# Patient Record
Sex: FEMALE | Race: WHITE | Employment: PART TIME | URBAN - METROPOLITAN AREA
[De-identification: names, ages, dates, MRNs, and addresses within clinical notes are randomized per-mention and may not be internally consistent; named-entity substitution may affect disease eponyms.]

---

## 2017-02-03 ENCOUNTER — TELEPHONE (OUTPATIENT)
Dept: GYNECOLOGY | Age: 42
End: 2017-02-03

## 2017-02-06 RX ORDER — MEDROXYPROGESTERONE ACETATE 10 MG/1
TABLET ORAL
Qty: 60 TABLET | Refills: 0 | Status: SHIPPED | OUTPATIENT
Start: 2017-02-06 | End: 2017-03-05 | Stop reason: SDUPTHER

## 2017-03-06 RX ORDER — MEDROXYPROGESTERONE ACETATE 10 MG/1
TABLET ORAL
Qty: 60 TABLET | Refills: 0 | Status: SHIPPED | OUTPATIENT
Start: 2017-03-06 | End: 2018-01-19 | Stop reason: ALTCHOICE

## 2017-03-21 ENCOUNTER — TELEPHONE (OUTPATIENT)
Dept: PRIMARY CARE CLINIC | Age: 42
End: 2017-03-21

## 2017-03-23 ENCOUNTER — OFFICE VISIT (OUTPATIENT)
Dept: GYNECOLOGY | Age: 42
End: 2017-03-23

## 2017-03-23 VITALS
HEIGHT: 64 IN | TEMPERATURE: 97.3 F | BODY MASS INDEX: 24.04 KG/M2 | RESPIRATION RATE: 17 BRPM | SYSTOLIC BLOOD PRESSURE: 115 MMHG | HEART RATE: 74 BPM | DIASTOLIC BLOOD PRESSURE: 75 MMHG | WEIGHT: 140.8 LBS

## 2017-03-23 DIAGNOSIS — Z09 FOLLOW-UP EXAM, 3-6 MONTHS SINCE PREVIOUS EXAM: ICD-10-CM

## 2017-03-23 DIAGNOSIS — R87.810 ASCUS WITH POSITIVE HIGH RISK HPV CERVICAL: Primary | ICD-10-CM

## 2017-03-23 DIAGNOSIS — R82.90 ABNORMAL URINE ODOR: ICD-10-CM

## 2017-03-23 DIAGNOSIS — R87.610 ASCUS WITH POSITIVE HIGH RISK HPV CERVICAL: Primary | ICD-10-CM

## 2017-03-23 DIAGNOSIS — N89.8 VAGINAL ODOR: ICD-10-CM

## 2017-03-23 LAB
BACTERIA WET PREP: NORMAL
CLUE CELLS: NORMAL
EPITHELIAL CELLS WET PREP: NORMAL
RBC WET PREP: NORMAL
SOURCE WET PREP: NORMAL
TRICHOMONAS PREP: NORMAL
WBC WET PREP: NORMAL
YEAST WET PREP: NORMAL

## 2017-03-23 PROCEDURE — 99213 OFFICE O/P EST LOW 20 MIN: CPT | Performed by: OBSTETRICS & GYNECOLOGY

## 2017-03-23 RX ORDER — THYROID 60 MG
60 TABLET ORAL DAILY
Status: ON HOLD | COMMUNITY
Start: 2017-03-13 | End: 2022-09-02

## 2017-03-25 LAB — URINE CULTURE, ROUTINE: NORMAL

## 2017-03-26 LAB
GENITAL CULTURE, ROUTINE: ABNORMAL
GENITAL CULTURE, ROUTINE: ABNORMAL
ORGANISM: ABNORMAL

## 2017-03-27 LAB
HPV COMMENT: ABNORMAL
HPV TYPE 16: DETECTED
HPV TYPE 18: NOT DETECTED
HPVOH (OTHER TYPES): NOT DETECTED

## 2017-03-28 DIAGNOSIS — N76.0 BV (BACTERIAL VAGINOSIS): Primary | ICD-10-CM

## 2017-03-28 DIAGNOSIS — B96.89 BV (BACTERIAL VAGINOSIS): Primary | ICD-10-CM

## 2017-03-28 RX ORDER — METRONIDAZOLE 500 MG/1
500 TABLET ORAL 2 TIMES DAILY
Qty: 14 TABLET | Refills: 0 | OUTPATIENT
Start: 2017-03-28 | End: 2017-04-04

## 2017-04-03 ENCOUNTER — TELEPHONE (OUTPATIENT)
Dept: GYNECOLOGY | Age: 42
End: 2017-04-03

## 2017-04-06 ENCOUNTER — OFFICE VISIT (OUTPATIENT)
Dept: PRIMARY CARE CLINIC | Age: 42
End: 2017-04-06

## 2017-04-06 VITALS
WEIGHT: 138 LBS | OXYGEN SATURATION: 99 % | BODY MASS INDEX: 23.69 KG/M2 | SYSTOLIC BLOOD PRESSURE: 107 MMHG | DIASTOLIC BLOOD PRESSURE: 70 MMHG | HEART RATE: 74 BPM

## 2017-04-06 DIAGNOSIS — T14.90XA INJURY: Primary | ICD-10-CM

## 2017-04-06 PROCEDURE — 99213 OFFICE O/P EST LOW 20 MIN: CPT | Performed by: INTERNAL MEDICINE

## 2017-04-06 ASSESSMENT — ENCOUNTER SYMPTOMS
GASTROINTESTINAL NEGATIVE: 1
EYES NEGATIVE: 1
RESPIRATORY NEGATIVE: 1

## 2017-04-17 ENCOUNTER — HOSPITAL ENCOUNTER (OUTPATIENT)
Dept: OTHER | Age: 42
Discharge: HOME | End: 2017-04-17
Attending: INTERNAL MEDICINE | Admitting: INTERNAL MEDICINE

## 2017-04-17 DIAGNOSIS — T14.90XA INJURY: ICD-10-CM

## 2017-04-17 LAB
T4 FREE: 1.1 NG/DL (ref 0.9–1.8)
TSH SERPL DL<=0.05 MIU/L-ACNC: 0.21 UIU/ML (ref 0.27–4.2)

## 2017-04-25 ENCOUNTER — TELEPHONE (OUTPATIENT)
Dept: GYNECOLOGY | Age: 42
End: 2017-04-25

## 2017-04-26 ENCOUNTER — OFFICE VISIT (OUTPATIENT)
Dept: GYNECOLOGY | Age: 42
End: 2017-04-26

## 2017-04-26 VITALS
SYSTOLIC BLOOD PRESSURE: 111 MMHG | HEIGHT: 64 IN | HEART RATE: 64 BPM | BODY MASS INDEX: 23.75 KG/M2 | RESPIRATION RATE: 17 BRPM | WEIGHT: 139.13 LBS | DIASTOLIC BLOOD PRESSURE: 76 MMHG

## 2017-04-26 DIAGNOSIS — R87.610 ASCUS WITH POSITIVE HIGH RISK HPV CERVICAL: ICD-10-CM

## 2017-04-26 DIAGNOSIS — Z32.00 ENCOUNTER FOR PREGNANCY TEST, RESULT UNKNOWN: ICD-10-CM

## 2017-04-26 DIAGNOSIS — L73.9 INFLAMMATION OF HAIR FOLLICLES: ICD-10-CM

## 2017-04-26 DIAGNOSIS — R87.810 ASCUS WITH POSITIVE HIGH RISK HPV CERVICAL: ICD-10-CM

## 2017-04-26 DIAGNOSIS — R87.611 PAP SMEAR OF CERVIX WITH ASCUS, CANNOT EXCLUDE HGSIL: Primary | ICD-10-CM

## 2017-04-26 LAB
CONTROL: NORMAL
PREGNANCY TEST URINE, POC: NORMAL

## 2017-04-26 PROCEDURE — 57454 BX/CURETT OF CERVIX W/SCOPE: CPT | Performed by: OBSTETRICS & GYNECOLOGY

## 2017-04-26 PROCEDURE — 81025 URINE PREGNANCY TEST: CPT | Performed by: OBSTETRICS & GYNECOLOGY

## 2017-04-28 ENCOUNTER — TELEPHONE (OUTPATIENT)
Dept: GYNECOLOGY | Age: 42
End: 2017-04-28

## 2017-05-03 DIAGNOSIS — N76.0 VAGINAL INFECTION: Primary | ICD-10-CM

## 2017-05-03 RX ORDER — CLINDAMYCIN PHOSPHATE 20 MG/G
CREAM VAGINAL
Qty: 1 TUBE | Refills: 0 | OUTPATIENT
Start: 2017-05-03 | End: 2017-05-10

## 2017-08-31 ENCOUNTER — OFFICE VISIT (OUTPATIENT)
Dept: GYNECOLOGY | Age: 42
End: 2017-08-31

## 2017-08-31 VITALS
BODY MASS INDEX: 23.11 KG/M2 | RESPIRATION RATE: 17 BRPM | DIASTOLIC BLOOD PRESSURE: 77 MMHG | SYSTOLIC BLOOD PRESSURE: 108 MMHG | WEIGHT: 135.38 LBS | TEMPERATURE: 98 F | HEART RATE: 78 BPM | HEIGHT: 64 IN

## 2017-08-31 DIAGNOSIS — Z98.890 H/O COLPOSCOPY WITH CERVICAL BIOPSY: ICD-10-CM

## 2017-08-31 DIAGNOSIS — N39.0 URINARY TRACT INFECTION WITHOUT HEMATURIA, SITE UNSPECIFIED: ICD-10-CM

## 2017-08-31 DIAGNOSIS — R87.810 ASCUS WITH POSITIVE HIGH RISK HPV CERVICAL: Primary | ICD-10-CM

## 2017-08-31 DIAGNOSIS — R10.2 PELVIC PAIN IN FEMALE: ICD-10-CM

## 2017-08-31 DIAGNOSIS — R87.610 ASCUS WITH POSITIVE HIGH RISK HPV CERVICAL: Primary | ICD-10-CM

## 2017-08-31 PROCEDURE — 99213 OFFICE O/P EST LOW 20 MIN: CPT | Performed by: OBSTETRICS & GYNECOLOGY

## 2017-09-02 LAB
ORGANISM: ABNORMAL
URINE CULTURE, ROUTINE: ABNORMAL

## 2017-09-05 ENCOUNTER — TELEPHONE (OUTPATIENT)
Dept: GYNECOLOGY | Age: 42
End: 2017-09-05

## 2017-09-05 DIAGNOSIS — M54.50 ACUTE MIDLINE LOW BACK PAIN WITHOUT SCIATICA: Primary | ICD-10-CM

## 2017-09-05 DIAGNOSIS — R30.0 DYSURIA: ICD-10-CM

## 2017-09-05 RX ORDER — TRAMADOL HYDROCHLORIDE 50 MG/1
50 TABLET ORAL EVERY 6 HOURS PRN
Qty: 20 TABLET | Refills: 0 | Status: SHIPPED | OUTPATIENT
Start: 2017-09-05 | End: 2017-09-15

## 2017-09-05 RX ORDER — NITROFURANTOIN 25; 75 MG/1; MG/1
100 CAPSULE ORAL 2 TIMES DAILY
Qty: 14 CAPSULE | Refills: 0 | Status: SHIPPED | OUTPATIENT
Start: 2017-09-05 | End: 2017-09-12

## 2017-09-07 ENCOUNTER — TELEPHONE (OUTPATIENT)
Dept: GYNECOLOGY | Age: 42
End: 2017-09-07

## 2017-09-07 DIAGNOSIS — R30.0 DYSURIA: Primary | ICD-10-CM

## 2017-09-07 RX ORDER — CEPHALEXIN 500 MG/1
500 CAPSULE ORAL 2 TIMES DAILY
Qty: 14 CAPSULE | Refills: 0 | Status: SHIPPED | OUTPATIENT
Start: 2017-09-07 | End: 2017-09-14

## 2017-09-27 ENCOUNTER — HOSPITAL ENCOUNTER (OUTPATIENT)
Dept: ULTRASOUND IMAGING | Age: 42
Discharge: HOME | End: 2017-09-27
Attending: OBSTETRICS & GYNECOLOGY | Admitting: OBSTETRICS & GYNECOLOGY

## 2017-09-27 DIAGNOSIS — R10.2 PELVIC AND PERINEAL PAIN: ICD-10-CM

## 2017-09-27 DIAGNOSIS — R10.2 PELVIC PAIN IN FEMALE: ICD-10-CM

## 2017-10-13 ENCOUNTER — TELEPHONE (OUTPATIENT)
Dept: GYNECOLOGY | Age: 42
End: 2017-10-13

## 2017-10-13 NOTE — TELEPHONE ENCOUNTER
Patient is currently scheduled for a hysterectomy on 11/8/17 and needs to cancel this. Patient states that this is a FMLA matter with work. She would like to proceed with procedure, but will need it scheduled after January 2018. Routing to MD and staff as Mariela Mcdonald.

## 2017-11-20 ENCOUNTER — OFFICE VISIT (OUTPATIENT)
Dept: PRIMARY CARE CLINIC | Age: 42
End: 2017-11-20

## 2017-11-20 VITALS
BODY MASS INDEX: 23.53 KG/M2 | WEIGHT: 141.2 LBS | HEIGHT: 65 IN | DIASTOLIC BLOOD PRESSURE: 72 MMHG | HEART RATE: 58 BPM | TEMPERATURE: 98.4 F | SYSTOLIC BLOOD PRESSURE: 112 MMHG | OXYGEN SATURATION: 97 %

## 2017-11-20 DIAGNOSIS — R31.9 URINARY TRACT INFECTION WITH HEMATURIA, SITE UNSPECIFIED: Primary | ICD-10-CM

## 2017-11-20 DIAGNOSIS — N39.0 URINARY TRACT INFECTION WITH HEMATURIA, SITE UNSPECIFIED: Primary | ICD-10-CM

## 2017-11-20 DIAGNOSIS — R30.0 DYSURIA: ICD-10-CM

## 2017-11-20 DIAGNOSIS — M54.5 ACUTE MIDLINE LOW BACK PAIN, WITH SCIATICA PRESENCE UNSPECIFIED: ICD-10-CM

## 2017-11-20 DIAGNOSIS — N92.6 IRREGULAR PERIODS: ICD-10-CM

## 2017-11-20 DIAGNOSIS — R10.9 ABDOMINAL PAIN, UNSPECIFIED ABDOMINAL LOCATION: ICD-10-CM

## 2017-11-20 LAB
BILIRUBIN, POC: NORMAL
BLOOD URINE, POC: NORMAL
CLARITY, POC: NORMAL
COLOR, POC: NORMAL
GLUCOSE URINE, POC: NORMAL
KETONES, POC: NORMAL
LEUKOCYTE EST, POC: NORMAL
NITRITE, POC: NORMAL
PH, POC: 7
PROTEIN, POC: NORMAL
SPECIFIC GRAVITY, POC: 1.02
UROBILINOGEN, POC: 0.2

## 2017-11-20 PROCEDURE — 81025 URINE PREGNANCY TEST: CPT | Performed by: FAMILY MEDICINE

## 2017-11-20 PROCEDURE — 99214 OFFICE O/P EST MOD 30 MIN: CPT | Performed by: FAMILY MEDICINE

## 2017-11-20 PROCEDURE — G8484 FLU IMMUNIZE NO ADMIN: HCPCS | Performed by: FAMILY MEDICINE

## 2017-11-20 PROCEDURE — 81002 URINALYSIS NONAUTO W/O SCOPE: CPT | Performed by: FAMILY MEDICINE

## 2017-11-20 PROCEDURE — G8420 CALC BMI NORM PARAMETERS: HCPCS | Performed by: FAMILY MEDICINE

## 2017-11-20 PROCEDURE — 1036F TOBACCO NON-USER: CPT | Performed by: FAMILY MEDICINE

## 2017-11-20 PROCEDURE — G8427 DOCREV CUR MEDS BY ELIG CLIN: HCPCS | Performed by: FAMILY MEDICINE

## 2017-11-20 RX ORDER — PHENAZOPYRIDINE HYDROCHLORIDE 100 MG/1
100 TABLET, FILM COATED ORAL 3 TIMES DAILY PRN
Qty: 20 TABLET | Refills: 0 | Status: SHIPPED | OUTPATIENT
Start: 2017-11-20 | End: 2018-01-15 | Stop reason: ALTCHOICE

## 2017-11-20 ASSESSMENT — ENCOUNTER SYMPTOMS
RECTAL PAIN: 0
EYE PAIN: 0
SINUS PRESSURE: 0
CONSTIPATION: 0
EYE ITCHING: 0
SORE THROAT: 0
VOMITING: 0
TROUBLE SWALLOWING: 0
RHINORRHEA: 0
COLOR CHANGE: 0
COUGH: 0
CHOKING: 0
BLOOD IN STOOL: 0
NAUSEA: 0
DIARRHEA: 0
CHEST TIGHTNESS: 0
EYE DISCHARGE: 0
SHORTNESS OF BREATH: 0
EYE REDNESS: 0
APNEA: 0
BACK PAIN: 0
PHOTOPHOBIA: 0
ABDOMINAL DISTENTION: 0
WHEEZING: 0
STRIDOR: 0
ABDOMINAL PAIN: 1

## 2017-11-20 ASSESSMENT — PATIENT HEALTH QUESTIONNAIRE - PHQ9
SUM OF ALL RESPONSES TO PHQ9 QUESTIONS 1 & 2: 0
1. LITTLE INTEREST OR PLEASURE IN DOING THINGS: 0
2. FEELING DOWN, DEPRESSED OR HOPELESS: 0
SUM OF ALL RESPONSES TO PHQ QUESTIONS 1-9: 0

## 2017-11-20 NOTE — PROGRESS NOTES
Subjective:      Patient ID: Paulo Kennedy is a 36 y.o. female. The chief complaint(s)  Worried about recurrent uti  HPI  Seen in er 11/16/17, [facility] for  Left flank pain at that time . Some burning of urine. Told she had back strain(lifted heavy ice bucket a day before the visit) and uti. She was treated with oxycodone, robaxin, ibuprofen, po prednisone taper  Not much better now  She has had previous uti in past. Now some dysuria, left flank, left abdom pain. No diarrhea. Currently on ceftin. Some chills  But no fever/ on antibiotic ceftin 250 mg bid since 11/16/17, days but back pain worse, radiating  Around to the left abdomen area/f;aml area. Some nausea. She Is on ibuprofen, oxycodone given(not  Taking this). Some nausea but not vomiting at this time. U/a at [facility] 0-3 rbc,  11-20 wbc. History of kidney stones    Several years kidney stones  Never treated  Has had \"bladder stretched in past\"      Review of Systems   Constitutional: Negative for activity change, appetite change, chills, diaphoresis, fatigue and fever. HENT: Negative for congestion, dental problem, drooling, ear discharge, ear pain, hearing loss, mouth sores, nosebleeds, postnasal drip, rhinorrhea, sinus pressure, sneezing, sore throat, tinnitus and trouble swallowing. Eyes: Negative for photophobia, pain, discharge, redness, itching and visual disturbance. Respiratory: Negative for apnea, cough, choking, chest tightness, shortness of breath, wheezing and stridor. Cardiovascular: Negative for chest pain, palpitations and leg swelling. Gastrointestinal: Positive for abdominal pain. Negative for abdominal distention, blood in stool, constipation, diarrhea, nausea, rectal pain and vomiting. Genitourinary: Positive for dysuria. Negative for decreased urine volume, difficulty urinating, dyspareunia, enuresis, flank pain, frequency, genital sores, hematuria, menstrual problem, pelvic pain, urgency, vaginal bleeding and vaginal pain. Musculoskeletal: Negative for arthralgias, back pain, gait problem, joint swelling, myalgias, neck pain and neck stiffness. Skin: Negative for color change, pallor, rash and wound. Neurological: Negative for dizziness, tremors, seizures, syncope, facial asymmetry, speech difficulty, weakness, light-headedness, numbness and headaches. Hematological: Negative for adenopathy. Does not bruise/bleed easily. Psychiatric/Behavioral: Negative for agitation, behavioral problems, confusion, decreased concentration, dysphoric mood, hallucinations, self-injury, sleep disturbance and suicidal ideas. The patient is not nervous/anxious and is not hyperactive. Objective:   Physical Exam   Constitutional: She is oriented to person, place, and time. She appears well-developed and well-nourished. No distress. HENT:   Head: Normocephalic and atraumatic. Right Ear: External ear normal.   Left Ear: External ear normal.   Nose: Nose normal.   Mouth/Throat: Oropharynx is clear and moist. No oropharyngeal exudate. Eyes: Conjunctivae and EOM are normal. Pupils are equal, round, and reactive to light. Left eye exhibits no discharge. No scleral icterus. Neck: Normal range of motion. Neck supple. No JVD present. No tracheal deviation present. No thyromegaly present. Cardiovascular: Normal rate, regular rhythm, normal heart sounds and intact distal pulses. Exam reveals no gallop and no friction rub. No murmur heard. Pulmonary/Chest: Effort normal and breath sounds normal. No stridor. No respiratory distress. She has no wheezes. She has no rales. She exhibits no tenderness. Abdominal: Soft. Bowel sounds are normal. She exhibits no distension and no mass. There is no tenderness. There is no rebound and no guarding. Mild left abdominal and flank pain/tenderness   Musculoskeletal: Normal range of motion. She exhibits no edema or tenderness. Lymphadenopathy:     She has no cervical adenopathy.    Neurological: She

## 2017-11-22 ENCOUNTER — TELEPHONE (OUTPATIENT)
Dept: PRIMARY CARE CLINIC | Age: 42
End: 2017-11-22

## 2017-11-22 DIAGNOSIS — N39.0 URINARY TRACT INFECTION WITHOUT HEMATURIA, SITE UNSPECIFIED: Primary | ICD-10-CM

## 2017-11-22 DIAGNOSIS — N92.6 IRREGULAR PERIODS: ICD-10-CM

## 2017-11-22 DIAGNOSIS — N39.0 URINARY TRACT INFECTION WITH HEMATURIA, SITE UNSPECIFIED: ICD-10-CM

## 2017-11-22 DIAGNOSIS — R31.9 URINARY TRACT INFECTION WITH HEMATURIA, SITE UNSPECIFIED: ICD-10-CM

## 2017-11-22 LAB
BILIRUBIN URINE: NEGATIVE
BLOOD, URINE: NEGATIVE
CLARITY: CLEAR
COLOR: YELLOW
EPITHELIAL CELLS, UA: 5 /HPF (ref 0–5)
GLUCOSE URINE: NEGATIVE MG/DL
HYALINE CASTS: 0 /LPF (ref 0–8)
KETONES, URINE: NEGATIVE MG/DL
LEUKOCYTE ESTERASE, URINE: ABNORMAL
MICROSCOPIC EXAMINATION: YES
NITRITE, URINE: NEGATIVE
PH UA: 6.5
PROTEIN UA: NEGATIVE MG/DL
RBC UA: 1 /HPF (ref 0–4)
SPECIFIC GRAVITY UA: 1.01
URINE TYPE: ABNORMAL
UROBILINOGEN, URINE: 0.2 E.U./DL
WBC UA: 5 /HPF (ref 0–5)

## 2017-11-24 ENCOUNTER — TELEPHONE (OUTPATIENT)
Dept: PRIMARY CARE CLINIC | Age: 42
End: 2017-11-24

## 2017-11-24 LAB — URINE CULTURE, ROUTINE: NORMAL

## 2017-11-27 ENCOUNTER — TELEPHONE (OUTPATIENT)
Dept: PRIMARY CARE CLINIC | Age: 42
End: 2017-11-27

## 2017-11-27 LAB
CONTROL: NORMAL
PREGNANCY TEST URINE, POC: NEGATIVE

## 2017-11-28 LAB
BASOPHILS ABSOLUTE: 0.1 K/UL (ref 0–0.2)
BASOPHILS RELATIVE PERCENT: 0.7 %
EOSINOPHILS ABSOLUTE: 0.2 K/UL (ref 0–0.6)
EOSINOPHILS RELATIVE PERCENT: 2.3 %
HCT VFR BLD CALC: 43.6 % (ref 36–48)
HEMOGLOBIN: 14.3 G/DL (ref 12–16)
LYMPHOCYTES ABSOLUTE: 2 K/UL (ref 1–5.1)
LYMPHOCYTES RELATIVE PERCENT: 25.7 %
MCH RBC QN AUTO: 30.2 PG (ref 26–34)
MCHC RBC AUTO-ENTMCNC: 32.8 G/DL (ref 31–36)
MCV RBC AUTO: 91.9 FL (ref 80–100)
MONOCYTES ABSOLUTE: 0.8 K/UL (ref 0–1.3)
MONOCYTES RELATIVE PERCENT: 9.9 %
NEUTROPHILS ABSOLUTE: 4.8 K/UL (ref 1.7–7.7)
NEUTROPHILS RELATIVE PERCENT: 61.4 %
PDW BLD-RTO: 13.8 % (ref 12.4–15.4)
PLATELET # BLD: 201 K/UL (ref 135–450)
PMV BLD AUTO: 10.2 FL (ref 5–10.5)
RBC # BLD: 4.75 M/UL (ref 4–5.2)
WBC # BLD: 7.9 K/UL (ref 4–11)

## 2017-11-30 ENCOUNTER — TELEPHONE (OUTPATIENT)
Dept: PRIMARY CARE CLINIC | Age: 42
End: 2017-11-30

## 2017-11-30 DIAGNOSIS — R30.0 DYSURIA: Primary | ICD-10-CM

## 2017-11-30 DIAGNOSIS — Z87.440 HISTORY OF RECURRENT UTI (URINARY TRACT INFECTION): ICD-10-CM

## 2017-11-30 DIAGNOSIS — Z87.440 HISTORY OF RECURRENT UTI (URINARY TRACT INFECTION): Primary | ICD-10-CM

## 2017-11-30 NOTE — TELEPHONE ENCOUNTER
She has completed antibiotics prescribed by gyn  Now having recurrent dysuria/burning on antibiotics  Was still on antibiotics when last culture done  Requesting repeat culture

## 2017-11-30 NOTE — TELEPHONE ENCOUNTER
She said she has had an uti since July and no she is not normal she can barely walk and she waubts an ultrasound or ct scan done. She said she should be in the hosp and that she just finished her atbx 2 days ago.  Please advise on her next step this has been going on long enough

## 2017-12-02 LAB — URINE CULTURE, ROUTINE: NORMAL

## 2017-12-04 ENCOUNTER — TELEPHONE (OUTPATIENT)
Dept: GYNECOLOGY | Age: 42
End: 2017-12-04

## 2017-12-04 NOTE — TELEPHONE ENCOUNTER
Pt states she had a surgery scheduled for November( Hysterectomy) but she had to cancel because she could not get a leave to take off work. She states she is still having the same issues and would now like to reschedule the surgery. Please call pt with info on how to proceed. Thanks

## 2017-12-08 NOTE — TELEPHONE ENCOUNTER
Called patient [facility] advising and to call us back to let us know.

## 2017-12-11 ENCOUNTER — OFFICE VISIT (OUTPATIENT)
Dept: PRIMARY CARE CLINIC | Age: 42
End: 2017-12-11

## 2017-12-11 VITALS
SYSTOLIC BLOOD PRESSURE: 116 MMHG | TEMPERATURE: 97.6 F | HEIGHT: 65 IN | HEART RATE: 57 BPM | WEIGHT: 143 LBS | BODY MASS INDEX: 23.82 KG/M2 | OXYGEN SATURATION: 100 % | DIASTOLIC BLOOD PRESSURE: 78 MMHG

## 2017-12-11 DIAGNOSIS — R35.0 URINARY FREQUENCY: Primary | ICD-10-CM

## 2017-12-11 DIAGNOSIS — R35.0 URINARY FREQUENCY: ICD-10-CM

## 2017-12-11 DIAGNOSIS — Z11.4 SCREENING FOR HIV (HUMAN IMMUNODEFICIENCY VIRUS): ICD-10-CM

## 2017-12-11 LAB
A/G RATIO: 1.6 (ref 1.1–2.2)
ALBUMIN SERPL-MCNC: 4.5 G/DL (ref 3.4–5)
ALP BLD-CCNC: 77 U/L (ref 40–129)
ALT SERPL-CCNC: 27 U/L (ref 10–40)
ANION GAP SERPL CALCULATED.3IONS-SCNC: 16 MMOL/L (ref 3–16)
AST SERPL-CCNC: 28 U/L (ref 15–37)
BACTERIA: ABNORMAL /HPF
BASOPHILS ABSOLUTE: 0.1 K/UL (ref 0–0.2)
BASOPHILS RELATIVE PERCENT: 1.1 %
BILIRUB SERPL-MCNC: 0.7 MG/DL (ref 0–1)
BILIRUBIN URINE: NEGATIVE
BLOOD, URINE: NEGATIVE
BUN BLDV-MCNC: 12 MG/DL (ref 7–20)
CALCIUM SERPL-MCNC: 9.4 MG/DL (ref 8.3–10.6)
CHLORIDE BLD-SCNC: 98 MMOL/L (ref 99–110)
CLARITY: CLEAR
CO2: 24 MMOL/L (ref 21–32)
COLOR: YELLOW
CREAT SERPL-MCNC: 0.6 MG/DL (ref 0.6–1.1)
EOSINOPHILS ABSOLUTE: 0.2 K/UL (ref 0–0.6)
EOSINOPHILS RELATIVE PERCENT: 2.9 %
EPITHELIAL CELLS, UA: 4 /HPF (ref 0–5)
GFR AFRICAN AMERICAN: >60
GFR NON-AFRICAN AMERICAN: >60
GLOBULIN: 2.8 G/DL
GLUCOSE BLD-MCNC: 87 MG/DL (ref 70–99)
GLUCOSE URINE: NEGATIVE MG/DL
HCT VFR BLD CALC: 40.2 % (ref 36–48)
HEMOGLOBIN: 13.1 G/DL (ref 12–16)
HYALINE CASTS: 1 /LPF (ref 0–8)
KETONES, URINE: NEGATIVE MG/DL
LEUKOCYTE ESTERASE, URINE: ABNORMAL
LYMPHOCYTES ABSOLUTE: 1.7 K/UL (ref 1–5.1)
LYMPHOCYTES RELATIVE PERCENT: 27.2 %
MCH RBC QN AUTO: 30.4 PG (ref 26–34)
MCHC RBC AUTO-ENTMCNC: 32.5 G/DL (ref 31–36)
MCV RBC AUTO: 93.5 FL (ref 80–100)
MICROSCOPIC EXAMINATION: YES
MONOCYTES ABSOLUTE: 0.8 K/UL (ref 0–1.3)
MONOCYTES RELATIVE PERCENT: 13.4 %
NEUTROPHILS ABSOLUTE: 3.4 K/UL (ref 1.7–7.7)
NEUTROPHILS RELATIVE PERCENT: 55.4 %
NITRITE, URINE: NEGATIVE
PDW BLD-RTO: 14.7 % (ref 12.4–15.4)
PH UA: 5.5
PLATELET # BLD: 202 K/UL (ref 135–450)
PMV BLD AUTO: 10.5 FL (ref 5–10.5)
POTASSIUM SERPL-SCNC: 4.5 MMOL/L (ref 3.5–5.1)
PROTEIN UA: NEGATIVE MG/DL
RBC # BLD: 4.3 M/UL (ref 4–5.2)
RBC UA: 2 /HPF (ref 0–4)
SODIUM BLD-SCNC: 138 MMOL/L (ref 136–145)
SPECIFIC GRAVITY UA: 1.02
TOTAL PROTEIN: 7.3 G/DL (ref 6.4–8.2)
URINE TYPE: ABNORMAL
UROBILINOGEN, URINE: 0.2 E.U./DL
WBC # BLD: 6.2 K/UL (ref 4–11)
WBC UA: 2 /HPF (ref 0–5)

## 2017-12-11 PROCEDURE — G8420 CALC BMI NORM PARAMETERS: HCPCS | Performed by: INTERNAL MEDICINE

## 2017-12-11 PROCEDURE — 99213 OFFICE O/P EST LOW 20 MIN: CPT | Performed by: INTERNAL MEDICINE

## 2017-12-11 PROCEDURE — G8484 FLU IMMUNIZE NO ADMIN: HCPCS | Performed by: INTERNAL MEDICINE

## 2017-12-11 PROCEDURE — 1036F TOBACCO NON-USER: CPT | Performed by: INTERNAL MEDICINE

## 2017-12-11 PROCEDURE — G8427 DOCREV CUR MEDS BY ELIG CLIN: HCPCS | Performed by: INTERNAL MEDICINE

## 2017-12-11 ASSESSMENT — ENCOUNTER SYMPTOMS
RESPIRATORY NEGATIVE: 1
EYES NEGATIVE: 1
GASTROINTESTINAL NEGATIVE: 1

## 2017-12-11 NOTE — PROGRESS NOTES
Subjective:      Patient ID: Stefani Umana is a 36 y.o. female. 12/11/17 Patient presents with:  Urinary Tract Infection: Patient states she still having back pain urinary frequency and burning sensation. Was treated for same by Dr Luke Dennison 11/20/17           Last seen by me 4/6/17             Urinary Tract Infection    This is a recurrent problem. The problem has been waxing and waning. The pain is moderate. She is sexually active. There is no history of pyelonephritis. Associated symptoms include frequency and urgency. Pertinent negatives include no possible pregnancy. She has tried antibiotics for the symptoms. The treatment provided no relief. Her past medical history is significant for recurrent UTIs. Review of Systems   HENT: Negative. Eyes: Negative. Respiratory: Negative. Cardiovascular: Negative. Gastrointestinal: Negative. Endocrine:        S/P Thyroidectomy . On replacement Synthroid    Genitourinary: Positive for frequency, pelvic pain and urgency. Negative for difficulty urinating, dysuria, vaginal discharge and vaginal pain. Abnormal Pap 8/17 . Advised Hysterectomy / Dr Lopez Hadley   Neurological: Positive for numbness. Objective:   Physical Exam   Constitutional: She is oriented to person, place, and time. She appears well-nourished. No distress. Eyes: Conjunctivae are normal.   Neck: Neck supple. Cardiovascular: Regular rhythm and normal heart sounds. Pulmonary/Chest: Breath sounds normal.   Abdominal: She exhibits no distension. There is no tenderness. There is no rebound and no guarding. Musculoskeletal: Normal range of motion. Neurological: She is alert and oriented to person, place, and time. She has normal strength. She displays no tremor. No sensory deficit. Gait normal.   Psychiatric: Her speech is normal. Her mood appears anxious. Cognition and memory are normal.       Assessment:      Deepti Alvarez was seen today for urinary tract infection.     Diagnoses and all orders for this visit:    Urinary frequency  -     Urinalysis; Future  -     C.trachomatis N.gonorrhoeae DNA, Urine; Future  -     CBC Auto Differential; Future  -     Comprehensive Metabolic Panel; Future    Screening for HIV (human immunodeficiency virus)  -     HIV-1 AND HIV-2 ANTIBODIES;  Future                  Plan:

## 2017-12-11 NOTE — TELEPHONE ENCOUNTER
Called patient advised, she would like a call from Dr. No Colin says she is having a lot of groin pain, and during the night urgency afraid something else is going on, says she has not seen a \"urologist\" advised her of Dr. Catherine Melchor, but she would like to speak with Dr. No Colin first before deciding to go see a urologist.

## 2017-12-12 DIAGNOSIS — N39.0 RECURRENT UTI: Primary | ICD-10-CM

## 2017-12-12 LAB
C. TRACHOMATIS DNA ,URINE: NEGATIVE
HIV-1 AND HIV-2 ANTIBODIES: NORMAL
N. GONORRHOEAE DNA, URINE: NEGATIVE

## 2017-12-12 NOTE — TELEPHONE ENCOUNTER
Pt called again today to check status of this call. She would like a return call from Dr Kelly García if possible.  Please advise

## 2018-01-11 ENCOUNTER — TELEPHONE (OUTPATIENT)
Dept: GYNECOLOGY | Age: 43
End: 2018-01-11

## 2018-01-11 RX ORDER — IBUPROFEN 800 MG/1
800 TABLET ORAL EVERY 8 HOURS PRN
Qty: 40 TABLET | Refills: 1 | Status: SHIPPED | OUTPATIENT
Start: 2018-01-11 | End: 2018-05-07

## 2018-01-15 ENCOUNTER — OFFICE VISIT (OUTPATIENT)
Dept: GYNECOLOGY | Age: 43
End: 2018-01-15

## 2018-01-15 VITALS
WEIGHT: 141 LBS | DIASTOLIC BLOOD PRESSURE: 64 MMHG | BODY MASS INDEX: 23.83 KG/M2 | HEART RATE: 68 BPM | SYSTOLIC BLOOD PRESSURE: 113 MMHG

## 2018-01-15 DIAGNOSIS — N89.8 VAGINAL DISCHARGE: ICD-10-CM

## 2018-01-15 DIAGNOSIS — R10.2 PELVIC PAIN IN FEMALE: ICD-10-CM

## 2018-01-15 DIAGNOSIS — R35.0 URINARY FREQUENCY: Primary | ICD-10-CM

## 2018-01-15 LAB
BACTERIA WET PREP: NORMAL
BILIRUBIN, POC: NORMAL
BLOOD URINE, POC: NORMAL
CLARITY, POC: NORMAL
CLUE CELLS: NORMAL
COLOR, POC: NORMAL
EPITHELIAL CELLS WET PREP: NORMAL
GLUCOSE URINE, POC: NORMAL
KETONES, POC: NORMAL
LEUKOCYTE EST, POC: NORMAL
NITRITE, POC: NORMAL
PH, POC: 5
PROTEIN, POC: NORMAL
RBC WET PREP: NORMAL
SOURCE WET PREP: NORMAL
SPECIFIC GRAVITY, POC: 1030
TRICHOMONAS PREP: NORMAL
UROBILINOGEN, POC: 1
WBC WET PREP: NORMAL
YEAST WET PREP: NORMAL

## 2018-01-15 PROCEDURE — 99213 OFFICE O/P EST LOW 20 MIN: CPT | Performed by: NURSE PRACTITIONER

## 2018-01-15 PROCEDURE — 81002 URINALYSIS NONAUTO W/O SCOPE: CPT | Performed by: NURSE PRACTITIONER

## 2018-01-15 PROCEDURE — G8427 DOCREV CUR MEDS BY ELIG CLIN: HCPCS | Performed by: NURSE PRACTITIONER

## 2018-01-15 PROCEDURE — 1036F TOBACCO NON-USER: CPT | Performed by: NURSE PRACTITIONER

## 2018-01-15 PROCEDURE — G8484 FLU IMMUNIZE NO ADMIN: HCPCS | Performed by: NURSE PRACTITIONER

## 2018-01-15 PROCEDURE — G8420 CALC BMI NORM PARAMETERS: HCPCS | Performed by: NURSE PRACTITIONER

## 2018-01-15 NOTE — PROGRESS NOTES
[address]- Gynecology  Progress Note  Shwetha Finney CNP       Ranjit Becerraclemencia   YOB: 1975    Date of Visit:  1/15/2018    Chief Complaint Urinary frequency, pelvic pain and vaginal discharge    Allergies   Allergen Reactions    Bactrim     Macrobid [Nitrofurantoin Monohyd Macro]      headache    Ranitidine Hcl      Nervousness and hallucinations    Sulfa Antibiotics      Head pain    Vicodin [Hydrocodone-Acetaminophen] Nausea And Vomiting     Outpatient Prescriptions Marked as Taking for the 1/15/18 encounter (Office Visit) with Lesli Umaña CNP   Medication Sig Dispense Refill    ibuprofen (ADVIL;MOTRIN) 800 MG tablet Take 1 tablet by mouth every 8 hours as needed for Pain 40 tablet 1    diclofenac (VOLTAREN) 50 MG EC tablet Take 1 tablet by mouth 2 times daily 60 tablet 0    ARMOUR THYROID 60 MG tablet             Vitals:    01/15/18 1644   BP: 113/64   Pulse: 68   Weight: 141 lb (64 kg)     Body mass index is 23.83 kg/m². Wt Readings from Last 3 Encounters:   01/15/18 141 lb (64 kg)   12/11/17 143 lb (64.9 kg)   11/20/17 141 lb 3.2 oz (64 kg)     BP Readings from Last 3 Encounters:   01/15/18 113/64   12/11/17 116/78   11/20/17 112/72      [facility]  Alex Higginbotham is a 36year old female presenting today with C/O urinary frequency, pelvic pain and vaginal discharge.  Symptoms:  Patient complains of a several month(s) history of dysuria, frequency, urgency, suprapubic pressure, abnormal vaginal discharge- yellow with odor and pelvic pain. She denies hematuria, urinary incontinence, abdominal/flank pain, fever, nausea/vomiting, diarrhea and constipation. Symptoms have been worsening with time. Sexually active: No.  Relevant medical history: recurrent UTI and recurrent vaginitis. Has H/O chronic pelvic pain and has planned hysterectomy next week. Treatment to date: none. Patient's last menstrual period was 01/13/2018 (exact date).     Review of Systems  As documented in HPI    Physical

## 2018-01-15 NOTE — PATIENT INSTRUCTIONS
disease and have to limit fluids, talk with your doctor before you increase your fluid intake.)  · Avoid drinks that are carbonated or have caffeine. They can irritate the bladder. · Urinate often. Try to empty your bladder each time. · To relieve pain, take a hot bath or lay a heating pad set on low over your lower belly or genital area. Never go to sleep with a heating pad in place. To prevent UTIs  · Drink plenty of water each day. This helps you urinate often, which clears bacteria from your system. (If you have kidney, heart, or liver disease and have to limit fluids, talk with your doctor before you increase your fluid intake.)  · Urinate when you need to. · Urinate right after you have sex. · Change sanitary pads often. · Avoid douches, bubble baths, feminine hygiene sprays, and other feminine hygiene products that have deodorants. · After going to the bathroom, wipe from front to back. When should you call for help? Call your doctor now or seek immediate medical care if:  ? · Symptoms such as fever, chills, nausea, or vomiting get worse or appear for the first time. ? · You have new pain in your back just below your rib cage. This is called flank pain. ? · There is new blood or pus in your urine. ? · You have any problems with your antibiotic medicine. ? Watch closely for changes in your health, and be sure to contact your doctor if:  ? · You are not getting better after taking an antibiotic for 2 days. ? · Your symptoms go away but then come back. Where can you learn more? Go to https://Virtual Computerlee.[address]. org and sign in to your Club W account. Enter Z025 in the [facility] box to learn more about \"Urinary Tract Infection in Women: Care Instructions. \"     If you do not have an account, please click on the \"Sign Up Now\" link. Current as of: May 12, 2017  Content Version: 11.5  © 6316-5405 Healthwise, Incorporated.  Care instructions adapted under license by [facility] medical care if:  ? · You have a new or higher fever. ? · You have unusual vaginal bleeding. ? · You have new or worse belly or pelvic pain. ? · You have vaginal discharge that has increased in amount or smells bad. ? Watch closely for changes in your health, and be sure to contact your doctor if:  ? · You do not get better as expected. Where can you learn more? Go to https://chpetavareseweb.healthAgradis. org and sign in to your ARPU account. Enter [phone] in the [employer]and box to learn more about \"Pelvic Pain: Care Instructions. \"     If you do not have an account, please click on the \"Sign Up Now\" link. Current as of: October 13, 2016  Content Version: 11.5  © 7577-4880 Healthwise, Incorporated. Care instructions adapted under license by [facility] ([facility]). If you have questions about a medical condition or this instruction, always ask your healthcare professional. [address] any warranty or liability for your use of this information.

## 2018-01-17 ENCOUNTER — PAT TELEPHONE (OUTPATIENT)
Dept: PREADMISSION TESTING | Age: 43
End: 2018-01-17

## 2018-01-17 DIAGNOSIS — Z01.818 ENCOUNTER FOR PREADMISSION TESTING: Primary | ICD-10-CM

## 2018-01-17 DIAGNOSIS — N39.0 URINARY TRACT INFECTION WITHOUT HEMATURIA, SITE UNSPECIFIED: Primary | ICD-10-CM

## 2018-01-17 DIAGNOSIS — B96.89 BACTERIAL VAGINOSIS: Primary | ICD-10-CM

## 2018-01-17 DIAGNOSIS — N76.0 BACTERIAL VAGINOSIS: Primary | ICD-10-CM

## 2018-01-17 LAB
GENITAL CULTURE, ROUTINE: ABNORMAL
GENITAL CULTURE, ROUTINE: ABNORMAL
ORGANISM: ABNORMAL
URINE CULTURE, ROUTINE: NORMAL

## 2018-01-17 RX ORDER — METRONIDAZOLE 7.5 MG/G
GEL VAGINAL
Qty: 1 TUBE | Refills: 0 | Status: SHIPPED | OUTPATIENT
Start: 2018-01-17 | End: 2018-02-08

## 2018-01-17 RX ORDER — CEPHALEXIN 500 MG/1
500 CAPSULE ORAL 2 TIMES DAILY
Qty: 14 CAPSULE | Refills: 0 | Status: ON HOLD | OUTPATIENT
Start: 2018-01-17 | End: 2018-01-25 | Stop reason: HOSPADM

## 2018-01-18 ENCOUNTER — HOSPITAL ENCOUNTER (OUTPATIENT)
Dept: PREADMISSION TESTING | Age: 43
Discharge: HOME | End: 2018-01-18
Attending: OBSTETRICS & GYNECOLOGY | Admitting: OBSTETRICS & GYNECOLOGY

## 2018-01-18 DIAGNOSIS — Z01.818 ENCOUNTER FOR PREADMISSION TESTING: ICD-10-CM

## 2018-01-18 LAB
ABO/RH: NORMAL
ANION GAP SERPL CALCULATED.3IONS-SCNC: 11 MMOL/L (ref 3–16)
ANTIBODY SCREEN: NORMAL
BUN BLDV-MCNC: 12 MG/DL (ref 7–20)
CALCIUM SERPL-MCNC: 9.3 MG/DL (ref 8.3–10.6)
CHLORIDE BLD-SCNC: 97 MMOL/L (ref 99–110)
CO2: 29 MMOL/L (ref 21–32)
CREAT SERPL-MCNC: 0.6 MG/DL (ref 0.6–1.1)
GFR AFRICAN AMERICAN: >60
GFR NON-AFRICAN AMERICAN: >60
GLUCOSE BLD-MCNC: 89 MG/DL (ref 70–99)
GONADOTROPIN, CHORIONIC (HCG) QUANT: <5 MIU/ML
HCT VFR BLD CALC: 38.1 % (ref 36–48)
HEMOGLOBIN: 12.7 G/DL (ref 12–16)
MCH RBC QN AUTO: 31.1 PG (ref 26–34)
MCHC RBC AUTO-ENTMCNC: 33.4 G/DL (ref 31–36)
MCV RBC AUTO: 93.1 FL (ref 80–100)
PDW BLD-RTO: 14.2 % (ref 12.4–15.4)
PLATELET # BLD: 262 K/UL (ref 135–450)
PMV BLD AUTO: 10.5 FL (ref 5–10.5)
POTASSIUM SERPL-SCNC: 3.8 MMOL/L (ref 3.5–5.1)
RBC # BLD: 4.09 M/UL (ref 4–5.2)
SODIUM BLD-SCNC: 137 MMOL/L (ref 136–145)
WBC # BLD: 8.2 K/UL (ref 4–11)

## 2018-01-19 ENCOUNTER — PAT TELEPHONE (OUTPATIENT)
Dept: PREADMISSION TESTING | Age: 43
End: 2018-01-19

## 2018-01-19 VITALS — HEIGHT: 64 IN | BODY MASS INDEX: 23.05 KG/M2 | WEIGHT: 135 LBS

## 2018-01-19 LAB
EKG ATRIAL RATE: 57 BPM
EKG DIAGNOSIS: NORMAL
EKG P AXIS: 60 DEGREES
EKG P-R INTERVAL: 178 MS
EKG Q-T INTERVAL: 402 MS
EKG QRS DURATION: 84 MS
EKG QTC CALCULATION (BAZETT): 391 MS
EKG R AXIS: 67 DEGREES
EKG T AXIS: 66 DEGREES
EKG VENTRICULAR RATE: 57 BPM

## 2018-01-19 RX ORDER — HEPARIN SODIUM 5000 [USP'U]/ML
5000 INJECTION, SOLUTION INTRAVENOUS; SUBCUTANEOUS ONCE
Status: CANCELLED | OUTPATIENT
Start: 2018-01-24

## 2018-01-19 RX ORDER — FLUTICASONE PROPIONATE 50 MCG
1 SPRAY, SUSPENSION (ML) NASAL DAILY
COMMUNITY
Start: 2008-01-15 | End: 2018-01-19 | Stop reason: ALTCHOICE

## 2018-01-19 RX ORDER — ACETAMINOPHEN 500 MG
1000 TABLET ORAL PRN
COMMUNITY
Start: 2017-11-16 | End: 2018-02-08

## 2018-01-19 ASSESSMENT — PAIN SCALES - GENERAL: PAINLEVEL_OUTOF10: 5

## 2018-01-19 ASSESSMENT — PAIN DESCRIPTION - PAIN TYPE: TYPE: CHRONIC PAIN

## 2018-01-19 ASSESSMENT — PAIN - FUNCTIONAL ASSESSMENT: PAIN_FUNCTIONAL_ASSESSMENT: 0-10

## 2018-01-19 NOTE — PRE-PROCEDURE INSTRUCTIONS
[facility] time__1000__________        Surgery time__1225________    Take the following medications with a sip of water: armour thyroid-may do nasal spray    Do not eat or drink anything after 12:00 midnight prior to your surgery. This includes water chewing gum, mints and ice chips. You may brush your teeth and gargle the morning of your surgery, but do not swallow the water     Please see your family doctor/pediatrician for a history and physical and/or concerning medications. Bring any test results/reports from your physicians office. If you are under the care of a heart doctor or specialist doctor, please be aware that you may be asked to them for clearance    You may be asked to stop blood thinners such as Coumadin, Plavix, Fragmin, Lovenox, etc., or any anti-inflammatories such as:  Aspirin, Ibuprofen, Advil, Naproxen prior to your surgery. We also ask that you stop any OTC medications such as fish oil, vitamin E, glucosamine, garlic, Multivitamins, COQ 10, etc.may take tylenol-stop diclofenac    We ask that you do not smoke 24 hours prior to surgery  We ask that you do not  drink any alcoholic beverages 24 hours prior to surgery     You must make arrangements for a responsible adult to take you home after your surgery. For your safety you will not be allowed to leave alone or drive yourself home. Your surgery will be cancelled if you do not have a ride home. Also for your safety, it is strongly suggested that someone stay with you the first 24 hours after your surgery. A parent or legal guardian must accompany a child scheduled for surgery and plan to stay at the hospital until the child is discharged. Please do not bring other children with you. For your comfort, please wear simple loose fitting clothing to the hospital.  Please do not bring valuables.     Do not wear any make-up or nail polish on your fingers or toes      For your safety, please do not wear any jewelry or body piercing's on the day of surgery. All jewelry must be removed. If you have dentures, they will be removed before going to operating room. For your convenience, we will provide you with a container. If you wear contact lenses or glasses, they will be removed, please bring a case for them. If you have a living will and a durable power of  for healthcare, please bring in a copy. As part of our patient safety program to minimize surgical site infections, we ask you to do the following:    · Please notify your surgeon if you develop any illness between         now and the  day of your surgery. · This includes a cough, cold, fever, sore throat, nausea,         or vomiting, and diarrhea, etc.  ·  Please notify your surgeon if you experience dizziness, shortness         of breath or blurred vision between now and the time of your surgery. Do not shave your operative site 96 hours prior to surgery. For face and neck surgery, men may use an electric razor 48 hours   prior to surgery. You may shower the night before surgery or the morning of   your surgery with an antibacterial soap. You will need to bring a photo ID and insurance card    [facility] has an onsite pharmacy, would you like to utilize our pharmacy     If you will be staying overnight and use a C-pap machine, please bring   your C-pap to hospital     Our goal is to provide you with excellent care, therefore, visitors will be limited to two(2) in the room at a time so that we may focus on providing this care for you. Please contact pre-admission testing if you have any further questions. [facility] phone number:  [phone] PAT fax number:  [phone]  Please note these are generalized instructions for all surgical cases, you may be provided with more specific instructions according to your surgery.

## 2018-01-22 ENCOUNTER — SURG/PROC ORDERS (OUTPATIENT)
Dept: ANESTHESIOLOGY | Age: 43
End: 2018-01-22

## 2018-01-22 RX ORDER — SODIUM CHLORIDE 0.9 % (FLUSH) 0.9 %
10 SYRINGE (ML) INJECTION EVERY 12 HOURS SCHEDULED
Status: CANCELLED | OUTPATIENT
Start: 2018-01-22

## 2018-01-22 RX ORDER — SODIUM CHLORIDE 9 MG/ML
INJECTION, SOLUTION INTRAVENOUS CONTINUOUS
Status: CANCELLED | OUTPATIENT
Start: 2018-01-22

## 2018-01-22 RX ORDER — SODIUM CHLORIDE 0.9 % (FLUSH) 0.9 %
10 SYRINGE (ML) INJECTION PRN
Status: CANCELLED | OUTPATIENT
Start: 2018-01-22

## 2018-01-24 PROBLEM — Z90.710 S/P HYSTERECTOMY: Status: ACTIVE | Noted: 2018-01-24

## 2018-02-08 ENCOUNTER — OFFICE VISIT (OUTPATIENT)
Dept: GYNECOLOGY | Age: 43
End: 2018-02-08

## 2018-02-08 VITALS
BODY MASS INDEX: 24.41 KG/M2 | WEIGHT: 140 LBS | SYSTOLIC BLOOD PRESSURE: 105 MMHG | DIASTOLIC BLOOD PRESSURE: 67 MMHG | HEART RATE: 70 BPM

## 2018-02-08 DIAGNOSIS — R35.0 URINARY FREQUENCY: ICD-10-CM

## 2018-02-08 DIAGNOSIS — Z98.890 POST-OPERATIVE STATE: Primary | ICD-10-CM

## 2018-02-08 DIAGNOSIS — Z90.710 S/P HYSTERECTOMY: ICD-10-CM

## 2018-02-08 DIAGNOSIS — N39.0 RECURRENT UTI: ICD-10-CM

## 2018-02-08 LAB
BILIRUBIN, POC: NORMAL
BLOOD URINE, POC: NORMAL
CLARITY, POC: NORMAL
COLOR, POC: YELLOW
GLUCOSE URINE, POC: NORMAL
KETONES, POC: NORMAL
LEUKOCYTE EST, POC: NORMAL
NITRITE, POC: NORMAL
PH, POC: 5
PROTEIN, POC: NORMAL
SPECIFIC GRAVITY, POC: 1025
UROBILINOGEN, POC: 0.2

## 2018-02-08 PROCEDURE — 1111F DSCHRG MED/CURRENT MED MERGE: CPT | Performed by: NURSE PRACTITIONER

## 2018-02-08 PROCEDURE — 99024 POSTOP FOLLOW-UP VISIT: CPT | Performed by: NURSE PRACTITIONER

## 2018-02-08 PROCEDURE — 1036F TOBACCO NON-USER: CPT | Performed by: NURSE PRACTITIONER

## 2018-02-08 PROCEDURE — G8427 DOCREV CUR MEDS BY ELIG CLIN: HCPCS | Performed by: NURSE PRACTITIONER

## 2018-02-08 PROCEDURE — G8484 FLU IMMUNIZE NO ADMIN: HCPCS | Performed by: NURSE PRACTITIONER

## 2018-02-08 PROCEDURE — G8420 CALC BMI NORM PARAMETERS: HCPCS | Performed by: NURSE PRACTITIONER

## 2018-02-08 PROCEDURE — 81002 URINALYSIS NONAUTO W/O SCOPE: CPT | Performed by: NURSE PRACTITIONER

## 2018-02-08 RX ORDER — CIPROFLOXACIN 500 MG/1
500 TABLET, FILM COATED ORAL 2 TIMES DAILY
Qty: 6 TABLET | Refills: 0 | Status: SHIPPED | OUTPATIENT
Start: 2018-02-08 | End: 2018-02-11

## 2018-02-08 NOTE — PATIENT INSTRUCTIONS
instructions  ? · You may have some light vaginal bleeding. Wear sanitary pads if needed. Do not douche or use tampons. Follow-up care is a key part of your treatment and safety. Be sure to make and go to all appointments, and call your doctor if you are having problems. It's also a good idea to know your test results and keep a list of the medicines you take. When should you call for help? Call 911 anytime you think you may need emergency care. For example, call if:  ? · You passed out (lost consciousness). ? · You have chest pain, are short of breath, or cough up blood. ?Call your doctor now or seek immediate medical care if:  ? · You have pain that does not get better after you take pain medicine. ? · You cannot pass stools or gas. ? · You have vaginal discharge that has increased in amount or smells bad.   ? · You are sick to your stomach or cannot drink fluids. ? · You have loose stitches, or your incision comes open. ? · Bright red blood has soaked through the bandage over your incision. ? · You have signs of infection, such as:  ¨ Increased pain, swelling, warmth, or redness. ¨ Red streaks leading from the incision. ¨ Pus draining from the incision. ¨ A fever. ? · You have bright red vaginal bleeding that soaks one or more pads in an hour, or you have large clots. ? · You have signs of a blood clot in your leg (called a deep vein thrombosis), such as:  ¨ Pain in your calf, back of the knee, thigh, or groin. ¨ Redness and swelling in your leg. ? Watch closely for changes in your health, and be sure to contact your doctor if you have any problems. Where can you learn more? Go to https://PostabonpeSocial IQ (Social Influence Quotient).[employer] Broadcasting. org and sign in to your Horrance account. Enter M280 in the [employer] box to learn more about \"Abdominal Hysterectomy: What to Expect at Home. \"     If you do not have an account, please click on the \"Sign Up Now\" link.   Current as of: October 13,

## 2018-02-10 LAB — URINE CULTURE, ROUTINE: NORMAL

## 2018-02-15 ENCOUNTER — TELEPHONE (OUTPATIENT)
Dept: GYNECOLOGY | Age: 43
End: 2018-02-15

## 2018-02-15 DIAGNOSIS — R35.0 URINARY FREQUENCY: Primary | ICD-10-CM

## 2018-02-15 RX ORDER — PHENAZOPYRIDINE HYDROCHLORIDE 200 MG/1
200 TABLET, FILM COATED ORAL 3 TIMES DAILY PRN
Qty: 15 TABLET | Refills: 1 | Status: SHIPPED | OUTPATIENT
Start: 2018-02-15 | End: 2018-02-18

## 2018-02-19 ENCOUNTER — TELEPHONE (OUTPATIENT)
Dept: GYNECOLOGY | Age: 43
End: 2018-02-19

## 2018-02-26 ENCOUNTER — HOSPITAL ENCOUNTER (OUTPATIENT)
Dept: CT IMAGING | Age: 43
Discharge: HOME | End: 2018-02-26
Attending: UROLOGY | Admitting: UROLOGY

## 2018-02-26 DIAGNOSIS — R39.15 URGENCY OF URINATION: ICD-10-CM

## 2018-02-26 DIAGNOSIS — R30.0 DYSURIA: ICD-10-CM

## 2018-03-05 ENCOUNTER — OFFICE VISIT (OUTPATIENT)
Dept: GYNECOLOGY | Age: 43
End: 2018-03-05

## 2018-03-05 VITALS
DIASTOLIC BLOOD PRESSURE: 85 MMHG | TEMPERATURE: 98 F | SYSTOLIC BLOOD PRESSURE: 131 MMHG | WEIGHT: 140.13 LBS | BODY MASS INDEX: 24.83 KG/M2 | HEIGHT: 63 IN | HEART RATE: 75 BPM | RESPIRATION RATE: 17 BRPM

## 2018-03-05 DIAGNOSIS — Z98.890 POST-OPERATIVE STATE: Primary | ICD-10-CM

## 2018-03-05 PROCEDURE — 99024 POSTOP FOLLOW-UP VISIT: CPT | Performed by: OBSTETRICS & GYNECOLOGY

## 2018-03-22 ENCOUNTER — OFFICE VISIT (OUTPATIENT)
Dept: PRIMARY CARE CLINIC | Age: 43
End: 2018-03-22

## 2018-03-22 VITALS
RESPIRATION RATE: 14 BRPM | BODY MASS INDEX: 24.11 KG/M2 | HEIGHT: 63 IN | HEART RATE: 68 BPM | TEMPERATURE: 98.2 F | OXYGEN SATURATION: 99 % | DIASTOLIC BLOOD PRESSURE: 73 MMHG | SYSTOLIC BLOOD PRESSURE: 111 MMHG | WEIGHT: 136.1 LBS

## 2018-03-22 DIAGNOSIS — J02.9 PHARYNGITIS, UNSPECIFIED ETIOLOGY: Primary | ICD-10-CM

## 2018-03-22 DIAGNOSIS — J02.9 PHARYNGITIS, UNSPECIFIED ETIOLOGY: ICD-10-CM

## 2018-03-22 LAB
BASOPHILS ABSOLUTE: 0.1 K/UL (ref 0–0.2)
BASOPHILS RELATIVE PERCENT: 1.1 %
EOSINOPHILS ABSOLUTE: 0.2 K/UL (ref 0–0.6)
EOSINOPHILS RELATIVE PERCENT: 2.3 %
HCT VFR BLD CALC: 41.7 % (ref 36–48)
HEMOGLOBIN: 14.1 G/DL (ref 12–16)
LYMPHOCYTES ABSOLUTE: 2.2 K/UL (ref 1–5.1)
LYMPHOCYTES RELATIVE PERCENT: 24.2 %
MCH RBC QN AUTO: 31 PG (ref 26–34)
MCHC RBC AUTO-ENTMCNC: 33.9 G/DL (ref 31–36)
MCV RBC AUTO: 91.6 FL (ref 80–100)
MONOCYTES ABSOLUTE: 1.1 K/UL (ref 0–1.3)
MONOCYTES RELATIVE PERCENT: 11.9 %
NEUTROPHILS ABSOLUTE: 5.5 K/UL (ref 1.7–7.7)
NEUTROPHILS RELATIVE PERCENT: 60.5 %
PDW BLD-RTO: 14.1 % (ref 12.4–15.4)
PLATELET # BLD: 244 K/UL (ref 135–450)
PMV BLD AUTO: 10.6 FL (ref 5–10.5)
RBC # BLD: 4.56 M/UL (ref 4–5.2)
WBC # BLD: 9.1 K/UL (ref 4–11)

## 2018-03-22 PROCEDURE — 1036F TOBACCO NON-USER: CPT | Performed by: INTERNAL MEDICINE

## 2018-03-22 PROCEDURE — 99213 OFFICE O/P EST LOW 20 MIN: CPT | Performed by: INTERNAL MEDICINE

## 2018-03-22 PROCEDURE — G8420 CALC BMI NORM PARAMETERS: HCPCS | Performed by: INTERNAL MEDICINE

## 2018-03-22 PROCEDURE — G8484 FLU IMMUNIZE NO ADMIN: HCPCS | Performed by: INTERNAL MEDICINE

## 2018-03-22 PROCEDURE — G8427 DOCREV CUR MEDS BY ELIG CLIN: HCPCS | Performed by: INTERNAL MEDICINE

## 2018-03-22 ASSESSMENT — ENCOUNTER SYMPTOMS
SWOLLEN GLANDS: 1
SORE THROAT: 1

## 2018-03-22 NOTE — PROGRESS NOTES
[facility]  YOB: 1975    Date of Service:  3/22/2018    Chief Complaint   Patient presents with    Pharyngitis     x3 days          Pharyngitis   This is a new problem. The current episode started in the past 7 days. The problem occurs constantly. The problem has been unchanged. Associated symptoms include chills, headaches, a sore throat and swollen glands. Pertinent negatives include no arthralgias. Nothing aggravates the symptoms. She has tried nothing for the symptoms. The treatment provided mild (salt water and honey) relief. Allergies   Allergen Reactions    Bactrim Other (See Comments)     Head pain,pt states was out of it    Macrobid [facility] Macro]      headache    Ranitidine Hcl Hives     Nervousness and hallucinations    Sulfa Antibiotics      Head pain    Vicodin [Hydrocodone-Acetaminophen] Nausea And Vomiting     Outpatient Prescriptions Marked as Taking for the 3/22/18 encounter (Office Visit) with Johnathan Kurtz MD   Medication Sig Dispense Refill    promethazine (PHENERGAN) 25 MG tablet Take 1 tablet by mouth every 6 hours as needed for Nausea 30 tablet 1    naproxen (NAPROSYN) 500 MG tablet Take 500 mg by mouth 2 times daily (with meals)      sodium chloride (OCEAN, BABY AYR) 0.65 % nasal spray 1 spray by Nasal route as needed for Congestion      ibuprofen (ADVIL;MOTRIN) 800 MG tablet Take 1 tablet by mouth every 8 hours as needed for Pain 40 tablet 1    ARMOUR THYROID 60 MG tablet Take 60 mg by mouth daily            There is no immunization history on file for this patient.     Past Medical History:   Diagnosis Date    Abnormal finding on Pap smear, HPV DNA positive 2002    Acid reflux     ASCUS with positive high risk HPV cervical 05/2016    ASCUS with positive high risk HPV cervical 03/23/2017    Bladder disease     Bronchitis     Circulation problem     Dysmenorrhea     HSIL (high grade squamous intraepithelial lesion) on Pap place, and time. She appears well-developed and well-nourished. No distress. HENT:   Head: Normocephalic and atraumatic. Mouth/Throat: Oropharynx is clear and moist. No oropharyngeal exudate. Eyes: Conjunctivae are normal. Pupils are equal, round, and reactive to light. Neck: Normal range of motion. Neck supple. No tracheal deviation present. No thyromegaly present. Cardiovascular: Normal rate, regular rhythm and normal heart sounds. Pulmonary/Chest: Effort normal and breath sounds normal.   Musculoskeletal: Normal range of motion. Neurological: She is alert and oriented to person, place, and time. She has normal reflexes. Skin: Skin is warm and dry. Psychiatric: She has a normal mood and affect. Her behavior is normal. Judgment and thought content normal.       Lab Review   Office Visit on 02/08/2018   Component Date Value    Color, UA 02/08/2018 yellow     Clarity, UA 02/08/2018 turbis     Glucose, UA POC 02/08/2018 neg     Bilirubin, UA 02/08/2018 neg     Ketones, UA 02/08/2018 neg     Spec Grav, UA 02/08/2018 1025     Blood, UA POC 02/08/2018 large     pH, UA 02/08/2018 5.0     Protein, UA POC 02/08/2018 trace     Urobilinogen, UA 02/08/2018 0.2     Leukocytes, UA 02/08/2018 moderate     Nitrite, UA 02/08/2018 neg     Urine Culture, Routine 02/08/2018 <50,000 CFU/ml mixed skin/urogenital evangelist.  No further workup    Admission on 01/24/2018, Discharged on 01/26/2018   Component Date Value    ABO/Rh 01/24/2018 O POS     Antibody Screen 01/24/2018 NEG     Pregnancy, Urine 01/24/2018 Negative     WBC 01/25/2018 15.6*    RBC 01/25/2018 3.81*    Hemoglobin 01/25/2018 11.7*    Hematocrit 01/25/2018 35.4*    MCV 01/25/2018 92.8     MCH 01/25/2018 30.7     MCHC 01/25/2018 33.1     RDW 01/25/2018 14.5     Platelets 17/99/2148 202     MPV 01/25/2018 10.8*    Neutrophils % 01/25/2018 85.6     Lymphocytes % 01/25/2018 6.9     Monocytes % 01/25/2018 7.1     Eosinophils % Visit on 01/15/2018   Component Date Value    Genital Culture, Routine 01/15/2018 No Normal genital microbiota isolated*    Organism 01/15/2018 Gardnerella vaginalis*    Genital Culture, Routine 01/15/2018                      Value: Moderate growth  Susceptibility testing of this isolate is not routinely  performed. Metronidazole is the drug of choice. Systemic infections can be treated with  Ampicillin or Amoxicillin.  Trichomonas Prep 01/15/2018 None Seen     Yeast, Wet Prep 01/15/2018 None Seen     Clue Cells, Wet Prep 01/15/2018 None Seen     WBC, Wet Prep 01/15/2018 <1+     RBC, Wet Prep 01/15/2018 4+     Epi Cells 01/15/2018 <1+     Bacteria 01/15/2018 <1+     Source Wet Prep 01/15/2018 Vaginal     Color, UA 01/15/2018 red     Clarity, UA 01/15/2018 turbid     Glucose, UA POC 01/15/2018 neg     Bilirubin, UA 01/15/2018 neg     Ketones, UA 01/15/2018 neg     Spec Grav, UA 01/15/2018 1030     Blood, UA POC 01/15/2018 large     pH, UA 01/15/2018 5.0     Protein, UA POC 01/15/2018 trace     Urobilinogen, UA 01/15/2018 1     Leukocytes, UA 01/15/2018 neg     Nitrite, UA 01/15/2018 neg     Urine Culture, Routine 01/15/2018 <50,000 CFU/ml mixed skin/urogenital evangelist.  No further workup    Orders Only on 12/11/2017   Component Date Value    Bacteria, UA 12/11/2017 RARE*    Hyaline Casts, UA 12/11/2017 1     WBC, UA 12/11/2017 2     RBC, UA 12/11/2017 2     Epi Cells 12/11/2017 4    Orders Only on 12/11/2017   Component Date Value    Color, UA 12/11/2017 YELLOW     Clarity, UA 12/11/2017 Clear     Glucose, Ur 12/11/2017 Negative     Bilirubin Urine 12/11/2017 Negative     Ketones, Urine 12/11/2017 Negative     Specific Gravity, UA 12/11/2017 1.019     Blood, Urine 12/11/2017 Negative     pH, UA 12/11/2017 5.5     Protein, UA 12/11/2017 Negative     Urobilinogen, Urine 12/11/2017 0.2     Nitrite, Urine 12/11/2017 Negative     Leukocyte Esterase, Urine 12/11/2017 SMALL*    Microscopic Examination 12/11/2017 YES     Urine Type 12/11/2017 Voided     C. trachomatis DNA ,Urine 12/11/2017 Negative     N. gonorrhoeae DNA, Urine 12/11/2017 Negative     HIV-1/HIV-2 Ab 12/11/2017 Non-reactive     WBC 12/11/2017 6.2     RBC 12/11/2017 4.30     Hemoglobin 12/11/2017 13.1     Hematocrit 12/11/2017 40.2     MCV 12/11/2017 93.5     MCH 12/11/2017 30.4     MCHC 12/11/2017 32.5     RDW 12/11/2017 14.7     Platelets 38/30/3173 202     MPV 12/11/2017 10.5     Neutrophils % 12/11/2017 55.4     Lymphocytes % 12/11/2017 27.2     Monocytes % 12/11/2017 13.4     Eosinophils % 12/11/2017 2.9     Basophils % 12/11/2017 1.1     Neutrophils # 12/11/2017 3.4     Lymphocytes # 12/11/2017 1.7     Monocytes # 12/11/2017 0.8     Eosinophils # 12/11/2017 0.2     Basophils # 12/11/2017 0.1     Sodium 12/11/2017 138     Potassium 12/11/2017 4.5     Chloride 12/11/2017 98*    CO2 12/11/2017 24     Anion Gap 12/11/2017 16     Glucose 12/11/2017 87     BUN 12/11/2017 12     CREATININE 12/11/2017 0.6     GFR Non- 12/11/2017 >60     GFR  12/11/2017 >60     Calcium 12/11/2017 9.4     Total Protein 12/11/2017 7.3     Alb 12/11/2017 4.5     Albumin/Globulin Ratio 12/11/2017 1.6     Total Bilirubin 12/11/2017 0.7     Alkaline Phosphatase 12/11/2017 77     ALT 12/11/2017 27     AST 12/11/2017 28     Globulin 12/11/2017 2.8    Telephone on 11/30/2017   Component Date Value    Urine Culture, Routine 11/30/2017 <10,000 CFU/ml mixed skin/urogenital evangelist.  No further workup    Orders Only on 11/22/2017   Component Date Value    Hyaline Casts, UA 11/22/2017 0     WBC, UA 11/22/2017 5     RBC, UA 11/22/2017 1     Epi Cells 11/22/2017 5    Orders Only on 11/22/2017   Component Date Value    Color, UA 11/22/2017 YELLOW     Clarity, UA 11/22/2017 Clear     Glucose, Ur 11/22/2017 Negative     Bilirubin Urine 11/22/2017 Negative     Ketones, Urine

## 2018-03-22 NOTE — PATIENT INSTRUCTIONS
smoke around you. If you need help quitting, talk to your doctor about stop-smoking programs and medicines. These can increase your chances of quitting for good. · Use a vaporizer or humidifier to add moisture to your bedroom. Follow the directions for cleaning the machine. When should you call for help? Call your doctor now or seek immediate medical care if:  ? · You have new or worse trouble swallowing. ? · Your sore throat gets much worse on one side. ? Watch closely for changes in your health, and be sure to contact your doctor if you do not get better as expected. Where can you learn more? Go to [email or web address] and sign in to your [email or web address] account. Enter N799 in the Timehop box to learn more about \"Sore Throat: Care Instructions. \"     If you do not have an account, please click on the \"Sign Up Now\" link. Current as of: May 12, 2017  Content Version: 11.5  © 5434-5216 [employer]. Care instructions adapted under license by [facility] ([facility]). If you have questions about a medical condition or this instruction, always ask your healthcare professional. [place] [address] any warranty or liability for your use of this information. Patient Education        Rapid Strep Test: About This Test  What is it? A rapid strep test checks the bacteria in your throat to see if strep is the cause of your sore throat. Why is this test done? It may be done so your doctor can find out right away whether you have strep throat. There is another test for strep, called a throat culture, but that test takes a few days to get the results. How can you prepare for the test?  You don't need to do anything before you have this test.  What happens during the test?  · You will be asked to tilt your head back and open your mouth as wide as possible.   · Your doctor will press your tongue down with a flat stick (tongue depressor) and then examine your mouth and throat. · A clean cotton swab will be rubbed over the back of your throat, around your tonsils, and over any red areas or sores to collect a sample. How long does the test take? · The test takes less than a minute. · Results are available in 10 to 15 minutes. Follow-up care is a key part of your treatment and safety. Be sure to make and go to all appointments, and call your doctor if you are having problems. It's also a good idea to keep a list of the medicines you take. Ask your doctor when you can expect to have your test results. Where can you learn more? Go to [email or web address]://[email or web address] [email or web address].[email or web address] org and sign in to your [employer] account. Enter B356 in the Comeks box to learn more about \"Rapid Strep Test: About This Test.\"     If you do not have an account, please click on the \"Sign Up Now\" link. Current as of: May 12, 2017  Content Version: 11.5  © 5668-3722 [employer]. Care instructions adapted under license by [facility] ([facility]). If you have questions about a medical condition or this instruction, always ask your healthcare professional. [place] [address] any warranty or liability for your use of this information.

## 2018-03-25 LAB — THROAT CULTURE: NORMAL

## 2018-04-02 ENCOUNTER — OFFICE VISIT (OUTPATIENT)
Dept: GYNECOLOGY | Age: 43
End: 2018-04-02

## 2018-04-02 VITALS
SYSTOLIC BLOOD PRESSURE: 114 MMHG | HEIGHT: 63 IN | WEIGHT: 136.5 LBS | TEMPERATURE: 97.3 F | HEART RATE: 61 BPM | RESPIRATION RATE: 17 BRPM | BODY MASS INDEX: 24.19 KG/M2 | DIASTOLIC BLOOD PRESSURE: 75 MMHG

## 2018-04-02 DIAGNOSIS — Z98.890 POST-OPERATIVE STATE: Primary | ICD-10-CM

## 2018-04-02 PROCEDURE — 99024 POSTOP FOLLOW-UP VISIT: CPT | Performed by: OBSTETRICS & GYNECOLOGY

## 2018-05-08 ENCOUNTER — OFFICE VISIT (OUTPATIENT)
Dept: PRIMARY CARE CLINIC | Age: 43
End: 2018-05-08

## 2018-05-08 VITALS
HEART RATE: 58 BPM | WEIGHT: 136 LBS | TEMPERATURE: 97 F | RESPIRATION RATE: 16 BRPM | HEIGHT: 64 IN | DIASTOLIC BLOOD PRESSURE: 73 MMHG | BODY MASS INDEX: 23.22 KG/M2 | OXYGEN SATURATION: 100 % | SYSTOLIC BLOOD PRESSURE: 104 MMHG

## 2018-05-08 DIAGNOSIS — R10.84 GENERALIZED ABDOMINAL PAIN: Primary | ICD-10-CM

## 2018-05-08 PROCEDURE — 99213 OFFICE O/P EST LOW 20 MIN: CPT | Performed by: FAMILY MEDICINE

## 2018-05-08 PROCEDURE — 1036F TOBACCO NON-USER: CPT | Performed by: FAMILY MEDICINE

## 2018-05-08 PROCEDURE — G8420 CALC BMI NORM PARAMETERS: HCPCS | Performed by: FAMILY MEDICINE

## 2018-05-08 PROCEDURE — G8427 DOCREV CUR MEDS BY ELIG CLIN: HCPCS | Performed by: FAMILY MEDICINE

## 2018-07-16 ENCOUNTER — TELEPHONE (OUTPATIENT)
Dept: PRIMARY CARE CLINIC | Age: 43
End: 2018-07-16

## 2018-07-16 NOTE — TELEPHONE ENCOUNTER
Called patient offered appointment for today she states her daughter is sick and she taking her to her appoinment appointment. She will call in the morning to scheduled appointment she has to make sure her daughter is taking care.

## 2018-07-16 NOTE — TELEPHONE ENCOUNTER
Patient having symptoms feel weak,  sore throat ,cough no energy no fever think she may have gotten  Bacterial pneumonia from daughter who was diagnosed with it

## 2018-07-17 ENCOUNTER — OFFICE VISIT (OUTPATIENT)
Dept: PRIMARY CARE CLINIC | Age: 43
End: 2018-07-17

## 2018-07-17 VITALS
OXYGEN SATURATION: 98 % | SYSTOLIC BLOOD PRESSURE: 122 MMHG | HEART RATE: 71 BPM | BODY MASS INDEX: 23.22 KG/M2 | DIASTOLIC BLOOD PRESSURE: 76 MMHG | HEIGHT: 64 IN | TEMPERATURE: 98.1 F | WEIGHT: 136 LBS | RESPIRATION RATE: 16 BRPM

## 2018-07-17 DIAGNOSIS — J40 BRONCHITIS: ICD-10-CM

## 2018-07-17 DIAGNOSIS — R05.9 COUGH: Primary | ICD-10-CM

## 2018-07-17 DIAGNOSIS — R05.9 COUGH: ICD-10-CM

## 2018-07-17 PROCEDURE — G8420 CALC BMI NORM PARAMETERS: HCPCS | Performed by: INTERNAL MEDICINE

## 2018-07-17 PROCEDURE — G8427 DOCREV CUR MEDS BY ELIG CLIN: HCPCS | Performed by: INTERNAL MEDICINE

## 2018-07-17 PROCEDURE — 99213 OFFICE O/P EST LOW 20 MIN: CPT | Performed by: INTERNAL MEDICINE

## 2018-07-17 PROCEDURE — 1036F TOBACCO NON-USER: CPT | Performed by: INTERNAL MEDICINE

## 2018-07-17 RX ORDER — AMOXICILLIN AND CLAVULANATE POTASSIUM 875; 125 MG/1; MG/1
1 TABLET, FILM COATED ORAL 2 TIMES DAILY
Qty: 20 TABLET | Refills: 0 | Status: SHIPPED | OUTPATIENT
Start: 2018-07-17 | End: 2018-07-27

## 2018-07-17 ASSESSMENT — ENCOUNTER SYMPTOMS
EYES NEGATIVE: 1
EYE DISCHARGE: 0
SORE THROAT: 1
NAUSEA: 1
COUGH: 1

## 2018-07-17 NOTE — PROGRESS NOTES
[facility]  YOB: 1975    Date of Service:  7/17/2018    Chief Complaint   Patient presents with    Nausea     x 2-3 days daughter has bacterial pneumonia    Pharyngitis    Chest Congestion    Drainage       Recent productive cough  Pharyngitis   This is a new problem. The current episode started in the past 7 days. The problem occurs constantly. The problem has been gradually worsening. Associated symptoms include congestion, coughing, myalgias, nausea and a sore throat. Pertinent negatives include no arthralgias, headaches or rash. Nothing aggravates the symptoms. She has tried NSAIDs for the symptoms. The treatment provided no relief. Allergies   Allergen Reactions    Bactrim Other (See Comments)     Head pain,pt states was out of it    Macrobid [facility] Macro]      headache    Ranitidine Hcl Hives     Nervousness and hallucinations    Sulfa Antibiotics      Head pain    Vicodin [Hydrocodone-Acetaminophen] Nausea And Vomiting     Outpatient Prescriptions Marked as Taking for the 7/17/18 encounter (Office Visit) with Pascual Torres MD   Medication Sig Dispense Refill    amoxicillin-clavulanate (AUGMENTIN) 875-125 MG per tablet Take 1 tablet by mouth 2 times daily for 10 days 20 tablet 0    sodium chloride (OCEAN, BABY AYR) 0.65 % nasal spray 1 spray by Nasal route as needed for Congestion      ARMOUR THYROID 60 MG tablet Take 60 mg by mouth daily            There is no immunization history on file for this patient.     Past Medical History:   Diagnosis Date    Abnormal finding on Pap smear, HPV DNA positive 2002    Acid reflux     ASCUS with positive high risk HPV cervical 05/2016    ASCUS with positive high risk HPV cervical 03/23/2017    Bladder disease     Bronchitis     Circulation problem     Dysmenorrhea     HSIL (high grade squamous intraepithelial lesion) on Pap smear     Irregular uterine bleeding     Multiple thyroid nodules 2013    Eosinophils # 05/07/2018 0.4     Basophils # 05/07/2018 0.1     Sodium 05/07/2018 139     Potassium 05/07/2018 4.0     Chloride 05/07/2018 102     CO2 05/07/2018 25     Anion Gap 05/07/2018 12     Glucose 05/07/2018 79     BUN 05/07/2018 11     CREATININE 05/07/2018 0.5*    GFR Non- 05/07/2018 >60     GFR  05/07/2018 >60     Calcium 05/07/2018 9.0     Total Protein 05/07/2018 7.2     Alb 05/07/2018 4.3     Albumin/Globulin Ratio 05/07/2018 1.5     Total Bilirubin 05/07/2018 0.6     Alkaline Phosphatase 05/07/2018 100     ALT 05/07/2018 37     AST 05/07/2018 40*    Globulin 05/07/2018 2.9     Lipase 05/07/2018 33.0     Color, UA 05/07/2018 YELLOW     Clarity, UA 05/07/2018 CLOUDY*    Glucose, Ur 05/07/2018 Negative     Bilirubin Urine 05/07/2018 Negative     Ketones, Urine 05/07/2018 Negative     Specific Gravity, UA 05/07/2018 1.020     Blood, Urine 05/07/2018 Negative     pH, UA 05/07/2018 5.0     Protein, UA 05/07/2018 Negative     Urobilinogen, Urine 05/07/2018 0.2     Nitrite, Urine 05/07/2018 Negative     Leukocyte Esterase, Urine 05/07/2018 Negative     Microscopic Examination 05/07/2018 YES     Urine Reflex to Culture 05/07/2018 Yes     Urine Type 05/07/2018 Clean catch     hCG Qual 05/07/2018 Negative     Bacteria, UA 05/07/2018 1+*    Hyaline Casts, UA 05/07/2018 4     WBC, UA 05/07/2018 8*    RBC, UA 05/07/2018 1     Epi Cells 05/07/2018 13*    Urine Culture, Routine 05/07/2018 No growth at 18-36 hours    Orders Only on 03/22/2018   Component Date Value    Throat Culture 03/22/2018 Normal oral evangelist, No Beta Strep isolated    Orders Only on 03/22/2018   Component Date Value    WBC 03/22/2018 9.1     RBC 03/22/2018 4.56     Hemoglobin 03/22/2018 14.1     Hematocrit 03/22/2018 41.7     MCV 03/22/2018 91.6     MCH 03/22/2018 31.0     MCHC 03/22/2018 33.9     RDW 03/22/2018 14.1     Platelets 70/24/8894 244     MPV 03/22/2018 10.6*    Neutrophils % 03/22/2018 60.5     Lymphocytes % 03/22/2018 24.2     Monocytes % 03/22/2018 11.9     Eosinophils % 03/22/2018 2.3     Basophils % 03/22/2018 1.1     Neutrophils # 03/22/2018 5.5     Lymphocytes # 03/22/2018 2.2     Monocytes # 03/22/2018 1.1     Eosinophils # 03/22/2018 0.2     Basophils # 03/22/2018 0.1    Office Visit on 02/08/2018   Component Date Value    Color, UA 02/08/2018 yellow     Clarity, UA 02/08/2018 turbis     Glucose, UA POC 02/08/2018 neg     Bilirubin, UA 02/08/2018 neg     Ketones, UA 02/08/2018 neg     Spec Grav, UA 02/08/2018 1025     Blood, UA POC 02/08/2018 large     pH, UA 02/08/2018 5.0     Protein, UA POC 02/08/2018 trace     Urobilinogen, UA 02/08/2018 0.2     Leukocytes, UA 02/08/2018 moderate     Nitrite, UA 02/08/2018 neg     Urine Culture, Routine 02/08/2018 <50,000 CFU/ml mixed skin/urogenital evangelist.  No further workup    Admission on 01/24/2018, Discharged on 01/26/2018   Component Date Value    ABO/Rh 01/24/2018 O POS     Antibody Screen 01/24/2018 NEG     Pregnancy, Urine 01/24/2018 Negative     WBC 01/25/2018 15.6*    RBC 01/25/2018 3.81*    Hemoglobin 01/25/2018 11.7*    Hematocrit 01/25/2018 35.4*    MCV 01/25/2018 92.8     MCH 01/25/2018 30.7     MCHC 01/25/2018 33.1     RDW 01/25/2018 14.5     Platelets 52/70/7375 202     MPV 01/25/2018 10.8*    Neutrophils % 01/25/2018 85.6     Lymphocytes % 01/25/2018 6.9     Monocytes % 01/25/2018 7.1     Eosinophils % 01/25/2018 0.0     Basophils % 01/25/2018 0.4     Neutrophils # 01/25/2018 13.4*    Lymphocytes # 01/25/2018 1.1     Monocytes # 01/25/2018 1.1     Eosinophils # 01/25/2018 0.0     Basophils # 01/25/2018 0.1     Sodium 01/25/2018 138     Potassium reflex Magnesi* 01/25/2018 4.2     Chloride 01/25/2018 105     CO2 01/25/2018 24     Anion Gap 01/25/2018 9     Glucose 01/25/2018 120*    BUN 01/25/2018 7     CREATININE 01/25/2018 0.5*    GFR Non-

## 2018-07-18 LAB
BASOPHILS ABSOLUTE: 0.1 K/UL (ref 0–0.2)
BASOPHILS RELATIVE PERCENT: 0.7 %
EOSINOPHILS ABSOLUTE: 0.2 K/UL (ref 0–0.6)
EOSINOPHILS RELATIVE PERCENT: 2.5 %
HCT VFR BLD CALC: 40.5 % (ref 36–48)
HEMOGLOBIN: 13.4 G/DL (ref 12–16)
LYMPHOCYTES ABSOLUTE: 3.1 K/UL (ref 1–5.1)
LYMPHOCYTES RELATIVE PERCENT: 32.3 %
MCH RBC QN AUTO: 31.2 PG (ref 26–34)
MCHC RBC AUTO-ENTMCNC: 33.1 G/DL (ref 31–36)
MCV RBC AUTO: 94.2 FL (ref 80–100)
MONOCYTES ABSOLUTE: 1 K/UL (ref 0–1.3)
MONOCYTES RELATIVE PERCENT: 10.1 %
NEUTROPHILS ABSOLUTE: 5.2 K/UL (ref 1.7–7.7)
NEUTROPHILS RELATIVE PERCENT: 54.4 %
PDW BLD-RTO: 13.9 % (ref 12.4–15.4)
PLATELET # BLD: 258 K/UL (ref 135–450)
PMV BLD AUTO: 9.9 FL (ref 5–10.5)
RBC # BLD: 4.29 M/UL (ref 4–5.2)
WBC # BLD: 9.5 K/UL (ref 4–11)

## 2018-08-21 ENCOUNTER — TELEPHONE (OUTPATIENT)
Dept: PRIMARY CARE CLINIC | Age: 43
End: 2018-08-21

## 2018-08-21 NOTE — TELEPHONE ENCOUNTER
Patient hurt back 24 hours ago, possible pulled back. She's complaining that she can't move, sit etc. Asking for Dr. Jose L Chen to write prescription for back pain, so she won't be able to sit in ER since she's in severe pain.  Patient can be reached at: [phone]

## 2018-10-29 ENCOUNTER — TELEPHONE (OUTPATIENT)
Dept: GYNECOLOGY | Age: 43
End: 2018-10-29

## 2018-10-29 DIAGNOSIS — M54.5 LOW BACK PAIN, UNSPECIFIED BACK PAIN LATERALITY, UNSPECIFIED CHRONICITY, WITH SCIATICA PRESENCE UNSPECIFIED: ICD-10-CM

## 2018-10-29 DIAGNOSIS — R30.0 DYSURIA: ICD-10-CM

## 2018-10-29 DIAGNOSIS — R30.0 DYSURIA: Primary | ICD-10-CM

## 2018-10-30 ENCOUNTER — TELEPHONE (OUTPATIENT)
Dept: GYNECOLOGY | Age: 43
End: 2018-10-30

## 2018-10-30 NOTE — TELEPHONE ENCOUNTER
Patient requesting to know the results of her urine test.  Medication was scribed before her learned the results. Patient wants to know if she has an infection before she begins taking the medication. [place] Please leave message on voice mail.   [phone]

## 2018-10-31 LAB
ORGANISM: ABNORMAL
URINE CULTURE, ROUTINE: ABNORMAL
URINE CULTURE, ROUTINE: ABNORMAL

## 2018-11-01 ENCOUNTER — TELEPHONE (OUTPATIENT)
Dept: GYNECOLOGY | Age: 43
End: 2018-11-01

## 2018-11-01 NOTE — TELEPHONE ENCOUNTER
[address] [facility] [phone]    Called in meds under result note, called patient to advise, advised DONE

## 2018-11-20 ENCOUNTER — TELEPHONE (OUTPATIENT)
Dept: GYNECOLOGY | Age: 43
End: 2018-11-20

## 2018-11-20 DIAGNOSIS — R30.9 PAINFUL URINATION: Primary | ICD-10-CM

## 2018-11-20 DIAGNOSIS — R30.9 PAINFUL URINATION: ICD-10-CM

## 2018-11-22 LAB — URINE CULTURE, ROUTINE: NORMAL

## 2018-11-26 ENCOUNTER — TELEPHONE (OUTPATIENT)
Dept: GYNECOLOGY | Age: 43
End: 2018-11-26

## 2018-12-03 ENCOUNTER — OFFICE VISIT (OUTPATIENT)
Dept: GYNECOLOGY | Age: 43
End: 2018-12-03
Payer: COMMERCIAL

## 2018-12-03 VITALS
DIASTOLIC BLOOD PRESSURE: 79 MMHG | SYSTOLIC BLOOD PRESSURE: 120 MMHG | WEIGHT: 135 LBS | HEIGHT: 64 IN | HEART RATE: 60 BPM | BODY MASS INDEX: 23.05 KG/M2

## 2018-12-03 DIAGNOSIS — N76.0 BACTERIAL VAGINITIS: Primary | ICD-10-CM

## 2018-12-03 DIAGNOSIS — B96.89 BACTERIAL VAGINITIS: Primary | ICD-10-CM

## 2018-12-03 DIAGNOSIS — R30.0 DYSURIA: ICD-10-CM

## 2018-12-03 DIAGNOSIS — Z11.3 SCREEN FOR STD (SEXUALLY TRANSMITTED DISEASE): ICD-10-CM

## 2018-12-03 LAB
BACTERIA WET PREP: ABNORMAL
CLUE CELLS: ABNORMAL
EPITHELIAL CELLS WET PREP: ABNORMAL
RBC WET PREP: ABNORMAL
SOURCE WET PREP: ABNORMAL
TRICHOMONAS PREP: ABNORMAL
WBC WET PREP: ABNORMAL
YEAST WET PREP: ABNORMAL

## 2018-12-03 PROCEDURE — 99213 OFFICE O/P EST LOW 20 MIN: CPT | Performed by: OBSTETRICS & GYNECOLOGY

## 2018-12-03 RX ORDER — METRONIDAZOLE 7.5 MG/G
GEL VAGINAL
Qty: 70 G | Refills: 0 | Status: SHIPPED | OUTPATIENT
Start: 2018-12-03 | End: 2018-12-10

## 2018-12-03 ASSESSMENT — ENCOUNTER SYMPTOMS
CHEST TIGHTNESS: 0
RECTAL PAIN: 0
ABDOMINAL PAIN: 0
TROUBLE SWALLOWING: 0
APNEA: 0
VOMITING: 0
SHORTNESS OF BREATH: 0
NAUSEA: 0
COLOR CHANGE: 0
BACK PAIN: 0
ANAL BLEEDING: 0
DIARRHEA: 0
BLOOD IN STOOL: 0
COUGH: 0
WHEEZING: 0
PHOTOPHOBIA: 0
ABDOMINAL DISTENTION: 0
CONSTIPATION: 0
SORE THROAT: 0

## 2018-12-04 LAB
C TRACH DNA GENITAL QL NAA+PROBE: NEGATIVE
N. GONORRHOEAE DNA: NEGATIVE
URINE CULTURE, ROUTINE: NORMAL

## 2018-12-05 LAB
GENITAL CULTURE, ROUTINE: ABNORMAL
GENITAL CULTURE, ROUTINE: ABNORMAL
ORGANISM: ABNORMAL

## 2018-12-08 LAB
FINAL REPORT: NORMAL
PRELIMINARY: NORMAL

## 2018-12-10 DIAGNOSIS — A49.3 MYCOPLASMA INFECTION: ICD-10-CM

## 2018-12-10 DIAGNOSIS — A49.3 NGU DUE TO UREAPLASMA UREALYTICUM: Primary | ICD-10-CM

## 2018-12-10 DIAGNOSIS — N34.1 NGU DUE TO UREAPLASMA UREALYTICUM: Primary | ICD-10-CM

## 2018-12-10 RX ORDER — DOXYCYCLINE HYCLATE 100 MG
100 TABLET ORAL 2 TIMES DAILY
Qty: 14 TABLET | Refills: 0 | Status: SHIPPED | OUTPATIENT
Start: 2018-12-10 | End: 2018-12-17

## 2018-12-19 ENCOUNTER — TELEPHONE (OUTPATIENT)
Dept: ENT CLINIC | Age: 43
End: 2018-12-19

## 2018-12-28 ENCOUNTER — NURSE TRIAGE (OUTPATIENT)
Dept: OTHER | Facility: CLINIC | Age: 43
End: 2018-12-28

## 2018-12-28 ENCOUNTER — TELEPHONE (OUTPATIENT)
Dept: PRIMARY CARE CLINIC | Age: 43
End: 2018-12-28

## 2019-02-15 ENCOUNTER — TELEPHONE (OUTPATIENT)
Dept: PRIMARY CARE CLINIC | Age: 44
End: 2019-02-15

## 2019-03-14 ENCOUNTER — TELEPHONE (OUTPATIENT)
Dept: PRIMARY CARE CLINIC | Age: 44
End: 2019-03-14

## 2019-03-14 DIAGNOSIS — Z87.440 HISTORY OF RECURRENT UTI (URINARY TRACT INFECTION): Primary | ICD-10-CM

## 2019-03-14 DIAGNOSIS — Z87.440 HISTORY OF RECURRENT UTI (URINARY TRACT INFECTION): ICD-10-CM

## 2019-03-14 LAB
BACTERIA: ABNORMAL /HPF
BILIRUBIN URINE: NEGATIVE
BLOOD, URINE: ABNORMAL
CLARITY: ABNORMAL
COLOR: YELLOW
EPITHELIAL CELLS, UA: 6 /HPF (ref 0–5)
GLUCOSE URINE: NEGATIVE MG/DL
HYALINE CASTS: 3 /LPF (ref 0–8)
KETONES, URINE: NEGATIVE MG/DL
LEUKOCYTE ESTERASE, URINE: ABNORMAL
MICROSCOPIC EXAMINATION: YES
NITRITE, URINE: POSITIVE
PH UA: 5.5 (ref 5–8)
PROTEIN UA: 100 MG/DL
RBC UA: ABNORMAL /HPF (ref 0–2)
SPECIFIC GRAVITY UA: 1.01 (ref 1–1.03)
URINE TYPE: ABNORMAL
UROBILINOGEN, URINE: 0.2 E.U./DL
WBC UA: 368 /HPF (ref 0–5)

## 2019-03-15 ENCOUNTER — HOSPITAL ENCOUNTER (EMERGENCY)
Age: 44
Discharge: HOME | End: 2019-03-15
Payer: COMMERCIAL

## 2019-03-15 VITALS
OXYGEN SATURATION: 100 % | RESPIRATION RATE: 16 BRPM | BODY MASS INDEX: 22.66 KG/M2 | DIASTOLIC BLOOD PRESSURE: 62 MMHG | HEART RATE: 74 BPM | HEIGHT: 64 IN | SYSTOLIC BLOOD PRESSURE: 110 MMHG | TEMPERATURE: 97.1 F | WEIGHT: 132.72 LBS

## 2019-03-15 DIAGNOSIS — N10 ACUTE PYELONEPHRITIS: Primary | ICD-10-CM

## 2019-03-15 LAB
A/G RATIO: 1.3 (ref 1.1–2.2)
ALBUMIN SERPL-MCNC: 4 G/DL (ref 3.4–5)
ALP BLD-CCNC: 63 U/L (ref 40–129)
ALT SERPL-CCNC: 17 U/L (ref 10–40)
ANION GAP SERPL CALCULATED.3IONS-SCNC: 10 MMOL/L (ref 3–16)
AST SERPL-CCNC: 16 U/L (ref 15–37)
BASOPHILS ABSOLUTE: 0.1 K/UL (ref 0–0.2)
BASOPHILS RELATIVE PERCENT: 1.2 %
BILIRUB SERPL-MCNC: 0.9 MG/DL (ref 0–1)
BUN BLDV-MCNC: 6 MG/DL (ref 7–20)
CALCIUM SERPL-MCNC: 9.3 MG/DL (ref 8.3–10.6)
CHLORIDE BLD-SCNC: 101 MMOL/L (ref 99–110)
CO2: 27 MMOL/L (ref 21–32)
CREAT SERPL-MCNC: 0.5 MG/DL (ref 0.6–1.1)
EOSINOPHILS ABSOLUTE: 0.2 K/UL (ref 0–0.6)
EOSINOPHILS RELATIVE PERCENT: 1.8 %
GFR AFRICAN AMERICAN: >60
GFR NON-AFRICAN AMERICAN: >60
GLOBULIN: 3.2 G/DL
GLUCOSE BLD-MCNC: 139 MG/DL (ref 70–99)
HCT VFR BLD CALC: 41.1 % (ref 36–48)
HEMOGLOBIN: 13.4 G/DL (ref 12–16)
LIPASE: 21 U/L (ref 13–60)
LYMPHOCYTES ABSOLUTE: 1.6 K/UL (ref 1–5.1)
LYMPHOCYTES RELATIVE PERCENT: 18.3 %
MCH RBC QN AUTO: 30.5 PG (ref 26–34)
MCHC RBC AUTO-ENTMCNC: 32.6 G/DL (ref 31–36)
MCV RBC AUTO: 93.5 FL (ref 80–100)
MONOCYTES ABSOLUTE: 0.7 K/UL (ref 0–1.3)
MONOCYTES RELATIVE PERCENT: 8.5 %
NEUTROPHILS ABSOLUTE: 6.2 K/UL (ref 1.7–7.7)
NEUTROPHILS RELATIVE PERCENT: 70.2 %
PDW BLD-RTO: 14.5 % (ref 12.4–15.4)
PLATELET # BLD: 210 K/UL (ref 135–450)
PMV BLD AUTO: 10 FL (ref 5–10.5)
POTASSIUM SERPL-SCNC: 3.9 MMOL/L (ref 3.5–5.1)
RBC # BLD: 4.39 M/UL (ref 4–5.2)
SODIUM BLD-SCNC: 138 MMOL/L (ref 136–145)
TOTAL PROTEIN: 7.2 G/DL (ref 6.4–8.2)
WBC # BLD: 8.8 K/UL (ref 4–11)

## 2019-03-15 PROCEDURE — 85025 COMPLETE CBC W/AUTO DIFF WBC: CPT

## 2019-03-15 PROCEDURE — 36415 COLL VENOUS BLD VENIPUNCTURE: CPT

## 2019-03-15 PROCEDURE — 80053 COMPREHEN METABOLIC PANEL: CPT

## 2019-03-15 PROCEDURE — 99284 EMERGENCY DEPT VISIT MOD MDM: CPT

## 2019-03-15 PROCEDURE — 83690 ASSAY OF LIPASE: CPT

## 2019-03-15 RX ORDER — CEFUROXIME AXETIL 500 MG/1
500 TABLET ORAL 2 TIMES DAILY
Qty: 20 TABLET | Refills: 0 | Status: SHIPPED | OUTPATIENT
Start: 2019-03-15 | End: 2019-03-25

## 2019-03-15 ASSESSMENT — ENCOUNTER SYMPTOMS
NAUSEA: 0
BACK PAIN: 0
ABDOMINAL PAIN: 1
VOMITING: 0
CONSTIPATION: 1
SORE THROAT: 0
SHORTNESS OF BREATH: 0
DIARRHEA: 0

## 2019-03-15 ASSESSMENT — PAIN DESCRIPTION - ORIENTATION: ORIENTATION: RIGHT;LOWER

## 2019-03-15 ASSESSMENT — PAIN SCALES - GENERAL
PAINLEVEL_OUTOF10: 4

## 2019-03-15 ASSESSMENT — PAIN DESCRIPTION - LOCATION: LOCATION: ABDOMEN

## 2019-03-15 ASSESSMENT — PAIN DESCRIPTION - PAIN TYPE: TYPE: ACUTE PAIN

## 2019-03-15 ASSESSMENT — PAIN DESCRIPTION - DESCRIPTORS: DESCRIPTORS: DISCOMFORT

## 2019-03-16 LAB
ORGANISM: ABNORMAL
URINE CULTURE, ROUTINE: ABNORMAL
URINE CULTURE, ROUTINE: ABNORMAL

## 2019-03-30 ENCOUNTER — TELEPHONE (OUTPATIENT)
Dept: PRIMARY CARE CLINIC | Age: 44
End: 2019-03-30

## 2019-03-30 RX ORDER — AZITHROMYCIN 250 MG/1
250 TABLET, FILM COATED ORAL SEE ADMIN INSTRUCTIONS
Qty: 6 TABLET | Refills: 0 | Status: SHIPPED | OUTPATIENT
Start: 2019-03-30 | End: 2019-04-04

## 2019-04-23 ENCOUNTER — OFFICE VISIT (OUTPATIENT)
Dept: PRIMARY CARE CLINIC | Age: 44
End: 2019-04-23
Payer: COMMERCIAL

## 2019-04-23 VITALS
TEMPERATURE: 98.9 F | BODY MASS INDEX: 22.02 KG/M2 | HEART RATE: 74 BPM | DIASTOLIC BLOOD PRESSURE: 70 MMHG | SYSTOLIC BLOOD PRESSURE: 103 MMHG | HEIGHT: 64 IN | WEIGHT: 129 LBS

## 2019-04-23 DIAGNOSIS — J02.8 PHARYNGITIS DUE TO OTHER ORGANISM: Primary | ICD-10-CM

## 2019-04-23 LAB — S PYO AG THROAT QL: NORMAL

## 2019-04-23 PROCEDURE — 1036F TOBACCO NON-USER: CPT | Performed by: INTERNAL MEDICINE

## 2019-04-23 PROCEDURE — 87880 STREP A ASSAY W/OPTIC: CPT | Performed by: INTERNAL MEDICINE

## 2019-04-23 PROCEDURE — G8420 CALC BMI NORM PARAMETERS: HCPCS | Performed by: INTERNAL MEDICINE

## 2019-04-23 PROCEDURE — G8427 DOCREV CUR MEDS BY ELIG CLIN: HCPCS | Performed by: INTERNAL MEDICINE

## 2019-04-23 PROCEDURE — 99213 OFFICE O/P EST LOW 20 MIN: CPT | Performed by: INTERNAL MEDICINE

## 2019-04-23 RX ORDER — IBUPROFEN 400 MG/1
400 TABLET ORAL 3 TIMES DAILY PRN
Qty: 30 TABLET | Refills: 0 | Status: ON HOLD | OUTPATIENT
Start: 2019-04-23 | End: 2022-09-02

## 2019-04-23 RX ORDER — AMOXICILLIN 500 MG/1
500 CAPSULE ORAL 3 TIMES DAILY
Qty: 30 CAPSULE | Refills: 0 | Status: SHIPPED | OUTPATIENT
Start: 2019-04-23 | End: 2019-05-03

## 2019-04-23 ASSESSMENT — ENCOUNTER SYMPTOMS
SORE THROAT: 1
GASTROINTESTINAL NEGATIVE: 1
RESPIRATORY NEGATIVE: 1
EYES NEGATIVE: 1
COUGH: 0

## 2019-04-23 NOTE — PROGRESS NOTES
MG capsule; Take 1 capsule by mouth 3 times daily for 10 days  -     ibuprofen (ADVIL;MOTRIN) 400 MG tablet;  Take 1 tablet by mouth 3 times daily as needed for Pain                    Plan:

## 2019-06-25 ENCOUNTER — TELEPHONE (OUTPATIENT)
Dept: PRIMARY CARE CLINIC | Age: 44
End: 2019-06-25

## 2019-08-26 ENCOUNTER — OFFICE VISIT (OUTPATIENT)
Dept: FAMILY MEDICINE CLINIC | Age: 44
End: 2019-08-26
Payer: COMMERCIAL

## 2019-08-26 VITALS
DIASTOLIC BLOOD PRESSURE: 82 MMHG | BODY MASS INDEX: 21.07 KG/M2 | HEIGHT: 64 IN | WEIGHT: 123.4 LBS | SYSTOLIC BLOOD PRESSURE: 106 MMHG

## 2019-08-26 DIAGNOSIS — M25.522 PAIN OF BOTH ELBOWS: ICD-10-CM

## 2019-08-26 DIAGNOSIS — M54.16 LUMBAR BACK PAIN WITH RADICULOPATHY AFFECTING LEFT LOWER EXTREMITY: ICD-10-CM

## 2019-08-26 DIAGNOSIS — M25.521 PAIN OF BOTH ELBOWS: ICD-10-CM

## 2019-08-26 DIAGNOSIS — R53.83 FATIGUE, UNSPECIFIED TYPE: ICD-10-CM

## 2019-08-26 DIAGNOSIS — Z76.89 ENCOUNTER TO ESTABLISH CARE: Primary | ICD-10-CM

## 2019-08-26 DIAGNOSIS — E03.9 HYPOTHYROIDISM, UNSPECIFIED TYPE: ICD-10-CM

## 2019-08-26 LAB
A/G RATIO: 1.8 (ref 1.1–2.2)
ALBUMIN SERPL-MCNC: 4.8 G/DL (ref 3.4–5)
ALP BLD-CCNC: 85 U/L (ref 40–129)
ALT SERPL-CCNC: 34 U/L (ref 10–40)
ANION GAP SERPL CALCULATED.3IONS-SCNC: 14 MMOL/L (ref 3–16)
AST SERPL-CCNC: 30 U/L (ref 15–37)
BILIRUB SERPL-MCNC: 0.8 MG/DL (ref 0–1)
BUN BLDV-MCNC: 9 MG/DL (ref 7–20)
CALCIUM SERPL-MCNC: 10 MG/DL (ref 8.3–10.6)
CHLORIDE BLD-SCNC: 100 MMOL/L (ref 99–110)
CO2: 26 MMOL/L (ref 21–32)
CREAT SERPL-MCNC: 0.6 MG/DL (ref 0.6–1.1)
GFR AFRICAN AMERICAN: >60
GFR NON-AFRICAN AMERICAN: >60
GLOBULIN: 2.6 G/DL
GLUCOSE BLD-MCNC: 81 MG/DL (ref 70–99)
HCT VFR BLD CALC: 40.7 % (ref 36–48)
HEMOGLOBIN: 13.6 G/DL (ref 12–16)
MCH RBC QN AUTO: 31.3 PG (ref 26–34)
MCHC RBC AUTO-ENTMCNC: 33.5 G/DL (ref 31–36)
MCV RBC AUTO: 93.5 FL (ref 80–100)
PDW BLD-RTO: 13.6 % (ref 12.4–15.4)
PLATELET # BLD: 219 K/UL (ref 135–450)
PMV BLD AUTO: 10.6 FL (ref 5–10.5)
POTASSIUM SERPL-SCNC: 4.7 MMOL/L (ref 3.5–5.1)
RBC # BLD: 4.35 M/UL (ref 4–5.2)
RHEUMATOID FACTOR: <10 IU/ML
SODIUM BLD-SCNC: 140 MMOL/L (ref 136–145)
TOTAL PROTEIN: 7.4 G/DL (ref 6.4–8.2)
WBC # BLD: 5.8 K/UL (ref 4–11)

## 2019-08-26 PROCEDURE — 99214 OFFICE O/P EST MOD 30 MIN: CPT | Performed by: FAMILY MEDICINE

## 2019-08-26 PROCEDURE — G8420 CALC BMI NORM PARAMETERS: HCPCS | Performed by: FAMILY MEDICINE

## 2019-08-26 PROCEDURE — 36415 COLL VENOUS BLD VENIPUNCTURE: CPT | Performed by: FAMILY MEDICINE

## 2019-08-26 PROCEDURE — 1036F TOBACCO NON-USER: CPT | Performed by: FAMILY MEDICINE

## 2019-08-26 PROCEDURE — G8427 DOCREV CUR MEDS BY ELIG CLIN: HCPCS | Performed by: FAMILY MEDICINE

## 2019-08-26 ASSESSMENT — PATIENT HEALTH QUESTIONNAIRE - PHQ9
SUM OF ALL RESPONSES TO PHQ QUESTIONS 1-9: 0
SUM OF ALL RESPONSES TO PHQ QUESTIONS 1-9: 0
SUM OF ALL RESPONSES TO PHQ9 QUESTIONS 1 & 2: 0
1. LITTLE INTEREST OR PLEASURE IN DOING THINGS: 0
2. FEELING DOWN, DEPRESSED OR HOPELESS: 0

## 2019-08-26 ASSESSMENT — ENCOUNTER SYMPTOMS
VOMITING: 0
SINUS PRESSURE: 0
TROUBLE SWALLOWING: 0
CHEST TIGHTNESS: 0
RHINORRHEA: 0
ABDOMINAL PAIN: 0
FACIAL SWELLING: 0
NAUSEA: 0
WHEEZING: 0
SORE THROAT: 0
SHORTNESS OF BREATH: 0
COUGH: 0
DIARRHEA: 0

## 2019-08-26 NOTE — PATIENT INSTRUCTIONS
Thank you for coming. Please take you medications as prescribed. Please continue to eat a balanced diet and exercise. Labs were obtained and we will contact you with the results. If you have questions please let me know via phone or email.

## 2019-08-27 LAB — ANTI-NUCLEAR ANTIBODY (ANA): NEGATIVE

## 2019-10-11 ENCOUNTER — TELEPHONE (OUTPATIENT)
Dept: FAMILY MEDICINE CLINIC | Age: 44
End: 2019-10-11

## 2019-12-23 ENCOUNTER — TELEPHONE (OUTPATIENT)
Dept: GYNECOLOGY | Age: 44
End: 2019-12-23

## 2019-12-23 DIAGNOSIS — N89.8 VAGINAL ITCHING: Primary | ICD-10-CM

## 2019-12-23 RX ORDER — FLUCONAZOLE 150 MG/1
150 TABLET ORAL ONCE
Qty: 1 TABLET | Refills: 1 | OUTPATIENT
Start: 2019-12-23 | End: 2019-12-23

## 2019-12-23 RX ORDER — CLINDAMYCIN PHOSPHATE 20 MG/G
CREAM VAGINAL
Qty: 1 TUBE | Refills: 0 | Status: ON HOLD | OUTPATIENT
Start: 2019-12-23 | End: 2022-09-02

## 2020-01-28 ENCOUNTER — TELEPHONE (OUTPATIENT)
Dept: GYNECOLOGY | Age: 45
End: 2020-01-28

## 2020-01-28 NOTE — TELEPHONE ENCOUNTER
Symptoms: burning, irritation, pelvic cramps, slight discharge, odor still since last scripts were called in (weeks ago). It has never gone away.   Please call patient at [phone] re: being seen (rather than something else being called in.)  [address] [place] [address], [address] [place] [address],First Floor - F [phone]

## 2020-01-29 NOTE — TELEPHONE ENCOUNTER
Patient returned call, scheduled patient for 2/3/2020 at 11:10am at [facility] office.

## 2020-01-29 NOTE — TELEPHONE ENCOUNTER
I can see her at 11:10 am on 2/3/20 at [facility] or she can call Thursday am this week and see if we get a cancellation.

## 2020-02-04 ENCOUNTER — OFFICE VISIT (OUTPATIENT)
Dept: GYNECOLOGY | Age: 45
End: 2020-02-04
Payer: COMMERCIAL

## 2020-02-04 VITALS
RESPIRATION RATE: 17 BRPM | SYSTOLIC BLOOD PRESSURE: 118 MMHG | BODY MASS INDEX: 22.54 KG/M2 | HEART RATE: 53 BPM | WEIGHT: 127.2 LBS | DIASTOLIC BLOOD PRESSURE: 85 MMHG | HEIGHT: 63 IN

## 2020-02-04 PROCEDURE — G8484 FLU IMMUNIZE NO ADMIN: HCPCS | Performed by: OBSTETRICS & GYNECOLOGY

## 2020-02-04 PROCEDURE — 99396 PREV VISIT EST AGE 40-64: CPT | Performed by: OBSTETRICS & GYNECOLOGY

## 2020-02-04 RX ORDER — BETAMETHASONE DIPROPIONATE 0.05 %
OINTMENT (GRAM) TOPICAL NIGHTLY
Qty: 45 G | Refills: 1 | Status: SHIPPED | OUTPATIENT
Start: 2020-02-04 | End: 2020-02-18

## 2020-02-04 ASSESSMENT — ENCOUNTER SYMPTOMS
GASTROINTESTINAL NEGATIVE: 1
EYES NEGATIVE: 1
RESPIRATORY NEGATIVE: 1

## 2020-02-04 NOTE — PROGRESS NOTES
tenderness. Abdominal:      General: Abdomen is flat. There is no distension. Palpations: Abdomen is soft. There is no hepatomegaly or mass. Tenderness: There is no abdominal tenderness. There is no guarding or rebound. Hernia: No hernia is present. There is no hernia in the right inguinal area or left inguinal area. Genitourinary:     Exam position: Lithotomy position. Pubic Area: No rash. Labia:         Right: Rash present. No tenderness, lesion or injury. Left: Rash present. No tenderness, lesion or injury. Urethra: No prolapse, urethral pain, urethral swelling or urethral lesion. Vagina: No signs of injury and foreign body. No erythema, tenderness, bleeding, lesions or prolapsed vaginal walls. Adnexa: Right adnexa normal and left adnexa normal.        Right: No mass, tenderness or fullness. Left: No mass, tenderness or fullness. Rectum: No anal fissure or external hemorrhoid. Comments: Normal urethral meatus, nl urethra, nl bladder. White changes in perineum  Musculoskeletal: Normal range of motion. General: No tenderness. Lymphadenopathy:      Cervical: No cervical adenopathy. Skin:     General: Skin is warm and dry. Neurological:      General: No focal deficit present. Mental Status: She is alert and oriented to person, place, and time. Mental status is at baseline. Deep Tendon Reflexes: Reflexes are normal and symmetric. Psychiatric:         Mood and Affect: Mood normal.         Behavior: Behavior normal.         Thought Content: Thought content normal.         Judgment: Judgment normal.         Assessment:      1. Annual  2. Vaginal discharge  3. Vulvar rash  4. Pelvic pain female      Plan:      1. Pap, calcium, exercise, mammogram  2. Vaginal Cultures, UC  3. Diprolene ointment nightly for 2 weeks.    4. Pelvic US        Drake Bangura MD

## 2020-02-06 LAB
GENITAL CULTURE, ROUTINE: NORMAL
HPV COMMENT: ABNORMAL
HPV TYPE 16: NOT DETECTED
HPV TYPE 18: NOT DETECTED
HPVOH (OTHER TYPES): DETECTED
ORGANISM: ABNORMAL
URINE CULTURE, ROUTINE: ABNORMAL

## 2020-02-06 RX ORDER — CLINDAMYCIN PHOSPHATE 11.9 MG/ML
SOLUTION TOPICAL
Qty: 30 ML | Refills: 0 | Status: ON HOLD | OUTPATIENT
Start: 2020-02-06 | End: 2022-09-02

## 2020-02-08 LAB
FINAL REPORT: NORMAL
PRELIMINARY: NORMAL

## 2020-02-10 ENCOUNTER — TELEPHONE (OUTPATIENT)
Dept: GYNECOLOGY | Age: 45
End: 2020-02-10

## 2020-02-10 RX ORDER — DOXYCYCLINE HYCLATE 100 MG
100 TABLET ORAL 2 TIMES DAILY
Qty: 20 TABLET | Refills: 0 | Status: SHIPPED | OUTPATIENT
Start: 2020-02-10 | End: 2020-02-20

## 2020-02-10 RX ORDER — FLUCONAZOLE 150 MG/1
150 TABLET ORAL ONCE
Qty: 1 TABLET | Refills: 1 | OUTPATIENT
Start: 2020-02-10 | End: 2020-02-10

## 2020-03-24 ENCOUNTER — TELEPHONE (OUTPATIENT)
Dept: FAMILY MEDICINE CLINIC | Age: 45
End: 2020-03-24

## 2020-03-24 ENCOUNTER — OFFICE VISIT (OUTPATIENT)
Dept: PRIMARY CARE CLINIC | Age: 45
End: 2020-03-24
Payer: COMMERCIAL

## 2020-03-24 VITALS — TEMPERATURE: 97.9 F | HEART RATE: 68 BPM | OXYGEN SATURATION: 97 %

## 2020-03-24 PROCEDURE — G8484 FLU IMMUNIZE NO ADMIN: HCPCS | Performed by: NURSE PRACTITIONER

## 2020-03-24 PROCEDURE — G8427 DOCREV CUR MEDS BY ELIG CLIN: HCPCS | Performed by: NURSE PRACTITIONER

## 2020-03-24 PROCEDURE — 99212 OFFICE O/P EST SF 10 MIN: CPT | Performed by: NURSE PRACTITIONER

## 2020-03-24 PROCEDURE — G8420 CALC BMI NORM PARAMETERS: HCPCS | Performed by: NURSE PRACTITIONER

## 2020-03-24 PROCEDURE — 1036F TOBACCO NON-USER: CPT | Performed by: NURSE PRACTITIONER

## 2020-03-24 NOTE — TELEPHONE ENCOUNTER
Pt called to ask Dr Itzel Alcantara what to do? She is experiencing several health concerns and ca not get up with feeling weak and shortness of breathe.  Please give pt a call [phone]    Symptoms  Shortness of breathe  Heavy chest pains  Fatigue and weakness  Headaches  No fever  Feels hot inside  Slight cough  Sore throat  Vision issues  Achy      COVID19 questions asked  [address] [employer] [phone]

## 2020-03-24 NOTE — PATIENT INSTRUCTIONS
Preventing the Spread of Coronavirus Disease 2019 in Homes and Residential Communities   For the most recent information go to [email or web address]    Prevention steps for People with confirmed or suspected COVID-19 (including persons under investigation) who do not need to be hospitalized  and   People with confirmed COVID-19 who were hospitalized and determined to be medically stable to go home    Your healthcare provider and public health staff will evaluate whether you can be cared for at home. If it is determined that you do not need to be hospitalized and can be isolated at home, you will be monitored by staff from your local or state health department. You should follow the prevention steps below until a healthcare provider or local or state health department says you can return to your normal activities. Stay home except to get medical care  People who are mildly ill with COVID-19 are able to isolate at home during their illness. You should restrict activities outside your home, except for getting medical care. Do not go to work, school, or public areas. Avoid using public transportation, ride-sharing, or taxis. Separate yourself from other people and animals in your home  People: As much as possible, you should stay in a specific room and away from other people in your home. Also, you should use a separate bathroom, if available. Animals: You should restrict contact with pets and other animals while you are sick with COVID-19, just like you would around other people. Although there have not been reports of pets or other animals becoming sick with COVID-19, it is still recommended that people sick with COVID-19 limit contact with animals until more information is known about the virus. When possible, have another member of your household care for your animals while you are sick.  If you are sick with COVID-19, avoid contact with your pet, including petting, snuggling, being kissed or licked, and sharing food. If you must care for your pet or be around animals while you are sick, wash your hands before and after you interact with pets and wear a facemask. Call ahead before visiting your doctor  If you have a medical appointment, call the healthcare provider and tell them that you have or may have COVID-19. This will help the healthcare providers office take steps to keep other people from getting infected or exposed. Wear a facemask  You should wear a facemask when you are around other people (e.g., sharing a room or vehicle) or pets and before you enter a healthcare providers office. If you are not able to wear a facemask (for example, because it causes trouble breathing), then people who live with you should not stay in the same room with you, or they should wear a facemask if they enter your room. Cover your coughs and sneezes  Cover your mouth and nose with a tissue when you cough or sneeze. Throw used tissues in a lined trash can. Immediately wash your hands with soap and water for at least 20 seconds or, if soap and water are not available, clean your hands with an alcohol-based hand  that contains at least 60% alcohol. Clean your hands often  Wash your hands often with soap and water for at least 20 seconds, especially after blowing your nose, coughing, or sneezing; going to the bathroom; and before eating or preparing food. If soap and water are not readily available, use an alcohol-based hand  with at least 60% alcohol, covering all surfaces of your hands and rubbing them together until they feel dry. Soap and water are the best option if hands are visibly dirty. Avoid touching your eyes, nose, and mouth with unwashed hands. Avoid sharing personal household items  You should not share dishes, drinking glasses, cups, eating utensils, towels, or bedding with other people or pets in your home.  After using these items, they should departments.

## 2020-03-24 NOTE — PROGRESS NOTES
3/24/2020    FLU/COVID-19 CLINIC EVALUATION    HPI:  Has been taking Mucinex, Aleve and Vicks for symptom relief. Recent travels to [place]. SYMPTOMS:    Symptom duration, days:  [] 1   [x] 2 - Sunday   [] 3   [] 4   [] 5   [] 6   [] 7   [] 8   [] 9   [] 10   [] 11   [] 12   [] 13   [] 14 or greater    Symptom course:   [] Worsening     [] Stable     [] Improving    [] Fevers  [] Symptom (not measured)  [] Measured (Result: )  [x] Chills  [] Cough  [] Coughing up blood  [] Chest Congestion  [] Nasal Congestion  [x] Feeling short of breath - chest tightness. [x] Sometimes  [] Frequently  [] All the time  [] Chest pain  [] Headaches  []Tolerable  [] Severe  [x] Sore throat  [x] Muscle aches  [] Nausea  [] Vomiting  []Unable to keep fluids down  [] Diarrhea  []Severe    [x] OTHER SYMPTOMS:  Fatigue, Rhinorrhea, Ear pain. RISK FACTORS:  **Autoimmune  [] Pregnant or possibly pregnant  [] Age over 61  [] Diabetes  [] Heart disease  [] Asthma  [] COPD/Other chronic lung diseases  [] Active Cancer  [] On Chemotherapy  [] Taking oral steroids  [] History Lymphoma/Leukemia  [] Close contact with a lab confirmed COVID-19 patient within 14 days of symptom onset  [] History of travel from affected geographical areas within 14 days of symptom onset       VITALS:  Vitals:    03/24/20 1557   Pulse: 68   Temp: 97.9 °F (36.6 °C)   SpO2: 97%        TESTS:    POCT FLU:  [] Positive     []Negative    [] COVID-19 Test sent:      ASSESSMENT:   Diagnosis Orders   1. Influenza-like symptoms       [] Flu  [] Possible COVID-19    PLAN:    [x] Discharge home with written instructions for:  [] Flu management  [] Possible COVID-19 infection with self-quarantine and management of symptoms  [x] Follow-up with primary care physician or emergency department if worsens  [x] Evaluation per physician/nurse practitioner in clinic      An  electronic signature was used to authenticate this note.      --BRUCE Macias - CNP on 3/24/2020 at

## 2020-05-05 ENCOUNTER — TELEPHONE (OUTPATIENT)
Dept: GYNECOLOGY | Age: 45
End: 2020-05-05

## 2020-05-05 RX ORDER — FLUCONAZOLE 150 MG/1
150 TABLET ORAL ONCE
Qty: 1 TABLET | Refills: 1 | Status: SHIPPED | OUTPATIENT
Start: 2020-05-05 | End: 2020-05-05

## 2020-06-15 ENCOUNTER — TELEPHONE (OUTPATIENT)
Dept: GYNECOLOGY | Age: 45
End: 2020-06-15

## 2020-06-18 ENCOUNTER — OFFICE VISIT (OUTPATIENT)
Dept: GYNECOLOGY | Age: 45
End: 2020-06-18
Payer: COMMERCIAL

## 2020-06-18 VITALS
WEIGHT: 130.2 LBS | TEMPERATURE: 98.6 F | HEIGHT: 64 IN | BODY MASS INDEX: 22.23 KG/M2 | HEART RATE: 68 BPM | RESPIRATION RATE: 17 BRPM | DIASTOLIC BLOOD PRESSURE: 75 MMHG | SYSTOLIC BLOOD PRESSURE: 106 MMHG

## 2020-06-18 PROCEDURE — G8420 CALC BMI NORM PARAMETERS: HCPCS | Performed by: OBSTETRICS & GYNECOLOGY

## 2020-06-18 PROCEDURE — 99213 OFFICE O/P EST LOW 20 MIN: CPT | Performed by: OBSTETRICS & GYNECOLOGY

## 2020-06-18 PROCEDURE — G8427 DOCREV CUR MEDS BY ELIG CLIN: HCPCS | Performed by: OBSTETRICS & GYNECOLOGY

## 2020-06-18 PROCEDURE — 1036F TOBACCO NON-USER: CPT | Performed by: OBSTETRICS & GYNECOLOGY

## 2020-06-19 LAB
BACTERIA WET PREP: NORMAL
CLUE CELLS: NORMAL
EPITHELIAL CELLS WET PREP: NORMAL
RBC WET PREP: NORMAL
SOURCE WET PREP: NORMAL
TRICHOMONAS PREP: NORMAL
URINE CULTURE, ROUTINE: NORMAL
WBC WET PREP: NORMAL
YEAST WET PREP: NORMAL

## 2020-06-20 LAB
GENITAL CULTURE, ROUTINE: ABNORMAL
GENITAL CULTURE, ROUTINE: ABNORMAL
ORGANISM: ABNORMAL

## 2020-06-22 RX ORDER — CLINDAMYCIN PHOSPHATE 11.9 MG/ML
SOLUTION TOPICAL
Qty: 30 ML | Refills: 0 | Status: ON HOLD | OUTPATIENT
Start: 2020-06-22 | End: 2022-09-02

## 2020-06-22 ASSESSMENT — ENCOUNTER SYMPTOMS
GASTROINTESTINAL NEGATIVE: 1
EYES NEGATIVE: 1
RESPIRATORY NEGATIVE: 1

## 2020-06-26 ENCOUNTER — HOSPITAL ENCOUNTER (OUTPATIENT)
Dept: ULTRASOUND IMAGING | Age: 45
Discharge: HOME | End: 2020-06-26
Payer: COMMERCIAL

## 2020-06-26 PROCEDURE — 76856 US EXAM PELVIC COMPLETE: CPT

## 2020-06-26 PROCEDURE — 76830 TRANSVAGINAL US NON-OB: CPT

## 2020-07-06 ENCOUNTER — TELEPHONE (OUTPATIENT)
Dept: GYNECOLOGY | Age: 45
End: 2020-07-06

## 2020-07-06 RX ORDER — DOXYCYCLINE 100 MG/1
100 CAPSULE ORAL 2 TIMES DAILY
Qty: 14 CAPSULE | Refills: 0 | Status: SHIPPED | OUTPATIENT
Start: 2020-07-06 | End: 2020-07-13

## 2020-07-06 RX ORDER — FLUCONAZOLE 150 MG/1
150 TABLET ORAL ONCE
Qty: 1 TABLET | Refills: 1 | Status: SHIPPED | OUTPATIENT
Start: 2020-07-06 | End: 2020-07-06

## 2020-07-06 NOTE — TELEPHONE ENCOUNTER
Patient has completed the Clindamycin but feels worse. Did her urine show anything? Patient feels like she has a UTI.   Symptoms: pain with urination, frequency, nauseated, cramping  Symptoms still: discharge, itching, irritation, odor  Patient can be reached at [place] [address], [address] [phone]

## 2020-09-04 ENCOUNTER — OFFICE VISIT (OUTPATIENT)
Dept: FAMILY MEDICINE CLINIC | Age: 45
End: 2020-09-04
Payer: COMMERCIAL

## 2020-09-04 VITALS
DIASTOLIC BLOOD PRESSURE: 74 MMHG | TEMPERATURE: 98.9 F | SYSTOLIC BLOOD PRESSURE: 122 MMHG | WEIGHT: 136.4 LBS | BODY MASS INDEX: 23.29 KG/M2 | HEIGHT: 64 IN

## 2020-09-04 PROCEDURE — 1036F TOBACCO NON-USER: CPT | Performed by: FAMILY MEDICINE

## 2020-09-04 PROCEDURE — G8420 CALC BMI NORM PARAMETERS: HCPCS | Performed by: FAMILY MEDICINE

## 2020-09-04 PROCEDURE — 99213 OFFICE O/P EST LOW 20 MIN: CPT | Performed by: FAMILY MEDICINE

## 2020-09-04 PROCEDURE — G8427 DOCREV CUR MEDS BY ELIG CLIN: HCPCS | Performed by: FAMILY MEDICINE

## 2020-09-04 NOTE — PROGRESS NOTES
2020     Seward Litten (:  1975) is a 54 y.o. female, here for evaluation of the following medical concerns:    HPI     Left lower back- radiates down her left leg to the foot, SI joint pain, pain with sitting, has had this before, no lose of bowel or bladder control. Patient denied saddle anesthesia. Has this for three weeks, has had in the past and workup revealed pathology in the area L3-L4, had MRI in the past but has been years since her last one, had lumbar x ray in 2017, actuallly went to the [facility] [employer] clinic for evaluation? Never had surgery, PT generally helps, doesn't want medication due to side effects. Today,denied chest pain, sob, n, ,v, or diarhea. Review of Systems   Constitutional: Negative for activity change, fatigue, fever and unexpected weight change. HENT: Negative for congestion, ear pain and rhinorrhea. Respiratory: Negative for shortness of breath. Cardiovascular: Negative for chest pain and leg swelling. Gastrointestinal: Negative for abdominal pain, diarrhea, nausea and vomiting. Musculoskeletal: Positive for arthralgias, back pain and gait problem. Skin: Negative for color change. Prior to Visit Medications    Medication Sig Taking? Authorizing Provider   Naproxen Sodium (ALEVE PO) Take by mouth Yes Historical Provider, MD SOUSA THYROID 60 MG tablet Take 60 mg by mouth daily  Yes Historical Provider, MD   clindamycin (CLEOCIN-T) 1 % external solution Apply one applicator nightly for 7 nights. Patient not taking: Reported on 2020  Kiarra Felix MD   clindamycin (CLEOCIN-T) 1 % external solution Apply one applicator nightly for 7 nights. Patient not taking: Reported on 2020  Kiarra Felix MD   clindamycin (CLEOCIN) 2 % vaginal cream Place vaginally nightly.   Patient not taking: Reported on 2020  Kiarra Felix MD   ibuprofen (ADVIL;MOTRIN) 400 MG tablet Take 1 tablet by mouth 3 times daily as needed for Pain  Patient not taking: Reported on 9/4/2020  Garrett Chavez MD        Social History     Tobacco Use    Smoking status: Never Smoker    Smokeless tobacco: Never Used    Tobacco comment: around a lot of second hand smoke with her dad   Substance Use Topics    Alcohol use: Yes     Alcohol/week: 0.0 standard drinks     Comment: occasionaly        Vitals:    09/04/20 1504   BP: 122/74   Temp: 98.9 °F (37.2 °C)   Weight: 136 lb 6.4 oz (61.9 kg)   Height: 5' 3.5\" (1.613 m)     Estimated body mass index is 23.78 kg/m² as calculated from the following:    Height as of this encounter: 5' 3.5\" (1.613 m). Weight as of this encounter: 136 lb 6.4 oz (61.9 kg). Physical Exam  Vitals signs and nursing note reviewed. HENT:      Head: Normocephalic and atraumatic. Right Ear: External ear normal.      Left Ear: External ear normal.   Cardiovascular:      Rate and Rhythm: Regular rhythm. Heart sounds: Normal heart sounds. No murmur. Pulmonary:      Breath sounds: Normal breath sounds. No wheezing. Musculoskeletal:         General: Tenderness present. No swelling. Right lower leg: No edema. Left lower leg: No edema. Comments: Left SI joint pain with palpation, mild tenderness with pain in GT  Neg. Straight leg test  Neg. THIAGO test   Neurological:      Mental Status: She is alert and oriented to person, place, and time. Cranial Nerves: No cranial nerve deficit. Sensory: No sensory deficit. Motor: No weakness. Gait: Gait abnormal.      Deep Tendon Reflexes: Reflexes normal.         ASSESSMENT/PLAN:  1. Lumbar back pain with radiculopathy affecting lower extremity  Patient will use otc medication, declined steroids etc.. She was educated on the medication and its use. She will  Use ice, heat, and stretching. She was shown stretching exercises.    She will push fluids and rest.   Patient was referred for PT and if not improved will rtc for possible MRI  - XR LUMBAR SPINE (2-3 VIEWS); [facility]  - [facility]      No follow-ups on file.

## 2020-09-05 ASSESSMENT — ENCOUNTER SYMPTOMS
VOMITING: 0
RHINORRHEA: 0
SHORTNESS OF BREATH: 0
COLOR CHANGE: 0
DIARRHEA: 0
BACK PAIN: 1
ABDOMINAL PAIN: 0
NAUSEA: 0

## 2020-09-09 ENCOUNTER — HOSPITAL ENCOUNTER (OUTPATIENT)
Dept: GENERAL RADIOLOGY | Age: 45
Discharge: HOME | End: 2020-09-09
Payer: COMMERCIAL

## 2020-09-09 PROCEDURE — 72100 X-RAY EXAM L-S SPINE 2/3 VWS: CPT

## 2020-09-30 ENCOUNTER — HOSPITAL ENCOUNTER (OUTPATIENT)
Dept: PHYSICAL THERAPY | Age: 45
Setting detail: THERAPIES SERIES
Discharge: HOME | End: 2020-09-30
Payer: COMMERCIAL

## 2020-09-30 PROCEDURE — 97530 THERAPEUTIC ACTIVITIES: CPT

## 2020-09-30 PROCEDURE — 97110 THERAPEUTIC EXERCISES: CPT

## 2020-09-30 PROCEDURE — 97161 PT EVAL LOW COMPLEX 20 MIN: CPT

## 2020-09-30 NOTE — PLAN OF CARE
Jaspal Acosta [address] [facility], [address] [place] [address]  Phone: [phone]  Fax: [phone]     Physical Therapy Certification    Dear Referring Practitioner: Nehemiah Forde DO,    We had the pleasure of evaluating the following patient for physical therapy services at [address] [place] [address]. A summary of our findings can be found in the initial assessment below. This includes our plan of care. If you have any questions or concerns regarding these findings, please do not hesitate to contact me at the office phone number checked above. Thank you for the referral.       Physician Signature:_______________________________Date:__________________  By signing above (or electronic signature), therapists plan is approved by physician      Patient: Nolberto Cool   : 1975   MRN: [id number]  Referring Physician: Referring Practitioner: Nehemiah Forde DO      Evaluation Date: 2020      Medical Diagnosis Information:  Diagnosis: M54.16 (ICD-10-CM) - Lumbar back pain with radiculopathy affecting lower extremity   Treatment Diagnosis: Decreased Core/LE Strength                                         Insurance information: PT Insurance Information: [employer]     Precautions/ Contra-indications:     C-SSRS Triggered by Intake questionnaire (Past 2 wk assessment ):   [x] No, Questionnaire did not trigger screening.   [] Yes, Patient intake triggered C-SSRS Screening       [] C-SSRS Screening completed   [] PCP notified via Epic    Latex Allergy:  [x]NO      []YES  Preferred Language for Healthcare:   [x]English       []other:    SUBJECTIVE: Patient stated complaint: Has had on going low back and L radiating symptoms. Constant L LE radiating symptoms consisting of pain and N/T. L LE is weak. Denies any previous low back surgeries. Walking is affected by the pain.  Pain is 4+/5    Ankle Eversion (S1-2) 4+/5 4+/5    Great Toe Extension (L5)        TA Muscle Contraction Scale    Criteria                                               Score  Quality of Contraction   Not Present      [] 0   Rapid, Superificial     [x] 1   Just Perceptible     [] 2     Gentle, Slow      [] 3    Substitution   Resting       [] 0   Moderate to Strong     [x] 1    Subtle Perceptible     [] 2   None       [] 3    Symmetry of Contraction   Unilateral       [] 0   Bilateral/Asymmetrical     [x] 1   Symmetrical       [] 2    Breathing     Inability/Difficulty Breathing during contraction [x] 0   Able to hold contraction while Breathing  [] 1    Holding   Able to Hold Contraction <10 s   [x] 0   Able to Hold Contraction >10 s   [] 1      __/10  Adapted from [employer], Copyright 2009        Neural dynamic tension testing Normal Abnormal Comments   Slump Test  - Degree of knee flexion:       SLR       0-30      30-70  x    Femoral nerve (L2-4)          Orthopedic Special Tests:    Normal Abnormal N/A Comments   Toe walk         Heel Walk       Fwd Bend-aberrant or innominate mvmt)       Trendelenburg       Kemps/Quadrant       Stork       THIAGO/Curtis  x     Hip scour       SLR       Crossed SLR       Supine to sit       Hip thrust       SI distraction/compression       PA/Spring       Prone Instability test       Prone knee bend       Sacral Spring/thrust                    [x] Patient history, allergies, meds reviewed. Medical chart reviewed. See intake form. Review Of Systems (ROS):  [x]Performed Review of systems (Integumentary, CardioPulmonary, Neurological) by intake and observation. Intake form has been scanned into medical record. Patient has been instructed to contact their primary care physician regarding ROS issues if not already being addressed at this time.       Co-morbidities/Complexities (which will affect course of rehabilitation):   []None           Arthritic conditions   []Rheumatoid arthritis (M05.9)  []Osteoarthritis (M19.91)   Cardiovascular conditions   []Hypertension (I10)  []Hyperlipidemia (E78.5)  []Angina pectoris (I20)  []Atherosclerosis (I70)   Musculoskeletal conditions   []Disc pathology   []Congenital spine pathologies   []Prior surgical intervention  []Osteoporosis (M81.8)  []Osteopenia (M85.8)   Endocrine conditions   [x]Hypothyroid (E03.9)  []Hyperthyroid Gastrointestinal conditions   []Constipation (L52.52)   Metabolic conditions   []Morbid obesity (E66.01)  []Diabetes type 1(E10.65) or 2 (E11.65)   []Neuropathy (G60.9)     Pulmonary conditions   []Asthma (J45)  []Coughing   []COPD (J44.9)   Psychological Disorders  []Anxiety (F41.9)  []Depression (F32.9)   []Other:   []Other:           Barriers to/and or personal factors that will affect rehab potential:              []Age  []Sex    []Smoker              []Motivation/Lack of Motivation                        [x]Co-Morbidities              []Cognitive Function, education/learning barriers              []Environmental, home barriers              []profession/work barriers  []past PT/medical experience  []other:  Justification:     Falls Risk Assessment (30 days):   [x] Falls Risk assessed and no intervention required.   [] Falls Risk assessed and Patient requires intervention due to being higher risk   TUG score (>12s at risk):     [] Falls education provided, including         ASSESSMENT:  Functional Impairments:     []Noted lumbar/proximal hip hypomobility   []Noted lumbosacral and/or generalized hypermobility   []Decreased Lumbosacral/hip/LE functional ROM   [x]Decreased core/proximal hip strength and neuromuscular control    [x]Decreased LE functional strength    []Abnormal reflexes/sensation/myotomal/dermatomal deficits  [x]Reduced balance/proprioceptive control    []other:      Functional Activity Limitations (from functional questionnaire and intake)   [x]Reduced ability to tolerate prolonged functional positions   [x]Reduced ability or difficulty with changes of positions or transfers between positions   [x]Reduced ability to maintain good posture and demonstrate good body mechanics with sitting, bending, and lifting   [x]Reduced ability to sleep   [x] Reduced ability or tolerance with driving and/or computer work   [x]Reduced ability to perform lifting, reaching, carrying tasks   [x]Reduced ability to squat   [x]Reduced ability to forward bend   [x]Reduced ability to ambulate prolonged functional periods/distances/surfaces   [x]Reduced ability to ascend/descend stairs   []other:       Participation Restrictions   [x]Reduced participation in self care activities   [x]Reduced participation in home management activities   [x]Reduced participation in work activities   [x]Reduced participation in social activities. []Reduced participation in sport/recreational activities. Classification:   [x]Signs/symptoms consistent with Lumbar instability/stabilization subgroup. []Signs/symptoms consistent with Lumbar mobilization/manipulation subgroup, myotomes and dermatomes intact. Meets manipulation criteria. []Signs/symptoms consistent with Lumbar direction specific/centralization subgroup   []Signs/symptoms consistent with Lumbar traction subgroup     []Signs/symptoms consistent with lumbar facet dysfunction   []Signs/symptoms consistent with lumbar stenosis type dysfunction   []Signs/symptoms consistent with nerve root involvement including myotome & dermatome dysfunction   []Signs/symptoms consistent with post-surgical status including: decreased ROM, strength and function.    []signs/symptoms consistent with pathology which may benefit from Dry needling     []other:      Prognosis/Rehab Potential:      []Excellent   [x]Good    []Fair   []Poor    Tolerance of evaluation/treatment:    []Excellent   [x]Good    []Fair   []Poor     Physical Therapy Evaluation Complexity Justification  [x] A history of present problem with:  [] no personal factors and/or comorbidities that impact the plan of care;  []1-2 personal factors and/or comorbidities that impact the plan of care  []3 personal factors and/or comorbidities that impact the plan of care  [x] An examination of body systems using standardized tests and measures addressing any of the following: body structures and functions (impairments), activity limitations, and/or participation restrictions;:  [] a total of 1-2 or more elements   [] a total of 3 or more elements   [] a total of 4 or more elements   [x] A clinical presentation with:  [] stable and/or uncomplicated characteristics   [] evolving clinical presentation with changing characteristics  [] unstable and unpredictable characteristics;   [x] Clinical decision making of [] low [x] moderate, [] high complexity using standardized patient assessment instrument and/or measurable assessment of functional outcome. [x] EVAL (LOW) 76113 (typically 15 minutes face-to-face)  [] EVAL (MOD) 81558 (typically 30 minutes face-to-face)  [] EVAL (HIGH) 42647 (typically 45 minutes face-to-face)  [] RE-EVAL     PLAN: Begin PT focusing on: proximal hip mobilizations, LB mobs, LB core activation, proximal hip activation, and HEP    Frequency/Duration:  2 days per week for 6 Weeks:  Interventions:  [x]  Therapeutic exercise including: strength training, ROM, for LE, Glutes and core   [x]  NMR activation and proprioception for glutes , LE and Core   [x]  Manual therapy as indicated for Hip complex, LE and spine to include: Dry Needling/IASTM, STM, PROM, Gr I-IV mobilizations, manipulation. [x]  Modalities as needed that may include: thermal agents, E-stim, Biofeedback, US, iontophoresis as indicated  [x]  Patient education on joint protection, postural re-education, activity modification, progression of HEP.     HEP instruction: Written HEP instructions provided and reviewed     GOALS:  Patient stated goal: Work, moving, lifting, walking   [] Progressing: [] Met: [] Not Met: [] Adjusted    Therapist goals for Patient:   Short Term Goals: To be achieved in: 2 weeks  1. Independent in HEP and progression per patient tolerance, in order to prevent re-injury. [] Progressing: [] Met: [] Not Met: [] Adjusted  2. Patient will have a decrease in pain to facilitate improvement in movement, function, and ADLs as indicated by Functional Deficits. [] Progressing: [] Met: [] Not Met: [] Adjusted    Long Term Goals: To be achieved in: 6 weeks  1. Disability index score of 10% or less for the KEVEN to assist with reaching prior level of function. [] Progressing: [] Met: [] Not Met: [] Adjusted  2. Patient will demonstrate increased AROM to WNL, good LS mobility, good hip ROM to allow for proper joint functioning as indicated by patients Functional Deficits. [] Progressing: [] Met: [] Not Met: [] Adjusted  3. Patient will demonstrate an increase in Strength to good proximal hip and core activation to allow for proper functional mobility as indicated by patients Functional Deficits. [] Progressing: [] Met: [] Not Met: [] Adjusted  4. Patient will return to functional activities including prolonged amb without increased symptoms or restriction.    [] Progressing: [] Met: [] Not Met: [] Adjusted      Electronically signed by:  Moe Boo PT

## 2020-10-02 NOTE — FLOWSHEET NOTE
Mitchell Acosta  Phone: [phone]  Fax: [phone]    Physical Therapy Treatment Note/ Progress Report:     Date:  10/2/2020    Patient Name:  Saulo Huitron    :  1975  MRN: [id number]  Restrictions/Precautions:    Medical/Treatment Diagnosis Information:  · Diagnosis: M54.16 (ICD-10-CM) - Lumbar back pain with radiculopathy affecting lower extremity  · Treatment Diagnosis: Decreased Core/LE Strength  Insurance/Certification information:  PT Insurance Information: [employer]  Physician Information:  Referring Practitioner: Idris Pollard DO  Plan of care signed (Y/N): []  Yes  [x]  No     Date of Patient follow up with Physician:  EVGENY     Progress Report: []  Yes  [x]  No     Date Range for reporting period:  Beginnin20  Ending:     Progress report due (10 Rx/or 30 days whichever is less):      Recertification due (POC duration/ or 90 days whichever is less): 20     Visit # Insurance Allowable Auth required? Date Range    []  Yes  []  No      Latex Allergy:  [x]NO      []YES  Preferred Language for Healthcare:   [x]English       []other:    Functional Scale:        Date assessed:  Oswestry; raw score = 28; dysfunction = 56%   20    Pain level: 4-8/10     SUBJECTIVE:  See eval    OBJECTIVE: See eval      RESTRICTIONS/PRECAUTIONS: Thyroid issues    Exercises/Interventions:     Therapeutic Exercises  (61183) Resistance / level Sets/sec Reps Notes                                                                         Therapeutic Activities (27893)                                   Neuromuscular Re-ed (85881)                                                 Manual Intervention ([id number])                                                     Pt. Education:  -pt educated on diagnosis, prognosis and expectations for rehab  -all pt questions were answered    Home Exercise Program:  20:  The following exercises were performed and added to the pt's home program (educated on appropriate frequency, intensity and duration etc.):  Tra, SKTC, Piriformis stretch, multifidi walkouts. Distributed HO and TB    Therapeutic Exercise and NMR EXR  [x]  (74384) Provided verbal/tactile cueing for activities related to strengthening, flexibility, endurance, ROM for improvements in  [x] LE / Lumbar: LE, proximal hip, and core control with self care, mobility, lifting, ambulation. [] UE / Cervical: cervical, postural, scapular, scapulothoracic and UE control with self care, reaching, carrying, lifting, house/yardwork, driving, computer work.  []  (74639) Provided verbal/tactile cueing for activities related to improving balance, coordination, kinesthetic sense, posture, motor skill, proprioception to assist with   [] LE / lumbar: LE, proximal hip, and core control in self care, mobility, lifting, ambulation and eccentric single leg control. [] UE / cervical: cervical, scapular, scapulothoracic and UE control with self care, reaching, carrying, lifting, house/yardwork, driving, computer work.      NMR and Therapeutic Activities:    [x]  (60542 or 30399) Provided verbal/tactile cueing for activities related to improving balance, coordination, kinesthetic sense, posture, motor skill, proprioception and motor activation to allow for proper function of   [x] LE: / Lumbar core, proximal hip and LE with self care and ADLs  [] UE / Cervical: cervical, postural, scapular, scapulothoracic and UE control with self care, carrying, lifting, driving, computer work.   [] (28530) Gait Re-education- Provided training and instruction to the patient for proper LE, core and proximal hip recruitment and positioning and eccentric body weight control with ambulation re-education including up and down stairs     Home Exercise Program:    [x] (01545) Reviewed/Progressed HEP activities related to strengthening, flexibility, endurance, ROM of   [x] LE / Lumbar: core, proximal hip and LE for functional self-care, mobility, lifting and ambulation/stair navigation   [] UE / Cervical: cervical, postural, scapular, scapulothoracic and UE control with self care, reaching, carrying, lifting, house/yardwork, driving, computer work  []  (81775)Reviewed/Progressed HEP activities related to improving balance, coordination, kinesthetic sense, posture, motor skill, proprioception of   [] LE: core, proximal hip and LE for self care, mobility, lifting, and ambulation/stair navigation    [] UE / Cervical: cervical, postural,  scapular, scapulothoracic and UE control with self care, reaching, carrying, lifting, house/yardwork, driving, computer work    Manual Treatments:  PROM / STM / Oscillations-Mobs:  G-I, II, III, IV (PA's, Inf., Post.)  [] (06917) Provided manual therapy to mobilize LE, proximal hip and/or LS spine soft tissue/joints for the purpose of modulating pain, promoting relaxation,  increasing ROM, reducing/eliminating soft tissue swelling/inflammation/restriction, improving soft tissue extensibility and allowing for proper ROM for normal function with   [] LE / lumbar: self care, mobility, lifting and ambulation. [] UE / Cervical: self care, reaching, carrying, lifting, house/yardwork, driving, computer work. Modalities:  [] (40393) Vasopneumatic compression: Utilized vasopneumatic compression to decrease edema / swelling for the purpose of improving mobility and quad tone / recruitment which will allow for increased overall function including but not limited to self-care, transfers, ambulation, and ascending / descending stairs.        Modalities:      Charges:  Timed Code Treatment Minutes: 15   Total Treatment Minutes: 45     [x] EVAL - LOW (02796)   [] EVAL - MOD (09527)  [] EVAL - HIGH (22765)  [] RE-EVAL (21526)  [x] TE (74588) x  1    [] Ionto (99729)  [] NMR (30417) x      [] Vaso (55608)  [] Manual (96737) x      [] Ultrasound  [x] TA (49350) x 1     [] [facility] Traction (63962)  [] Gait Training x     [] ES (un) (61883):   [] Aquatic therapy x   [] Other:   [] Group:     If [facility] Please Indicate Time In/Out  CPT Code Time in Time out                                     GOALS:   goal: Work, moving, lifting, walking   []? Progressing: []? Met: []? Not Met: []? Adjusted     Therapist goals for Patient:   Short Term Goals: To be achieved in: 2 weeks  1. Independent in HEP and progression per patient tolerance, in order to prevent re-injury. []? Progressing: []? Met: []? Not Met: []? Adjusted  2. Patient will have a decrease in pain to facilitate improvement in movement, function, and ADLs as indicated by Functional Deficits. []? Progressing: []? Met: []? Not Met: []? Adjusted     Long Term Goals: To be achieved in: 6 weeks  1. Disability index score of 10% or less for the KEVEN to assist with reaching prior level of function. []? Progressing: []? Met: []? Not Met: []? Adjusted  2. Patient will demonstrate increased AROM to WNL, good LS mobility, good hip ROM to allow for proper joint functioning as indicated by patients Functional Deficits. []? Progressing: []? Met: []? Not Met: []? Adjusted  3. Patient will demonstrate an increase in Strength to good proximal hip and core activation to allow for proper functional mobility as indicated by patients Functional Deficits. []? Progressing: []? Met: []? Not Met: []? Adjusted  4. Patient will return to functional activities including prolonged amb without increased symptoms or restriction. []? Progressing: []? Met: []? Not Met: []? Adjusted    Overall Progression Towards Functional goals/ Treatment Progress Update:  [] Patient is progressing as expected towards functional goals listed. [] Progression is slowed due to complexities/Impairments listed. [] Progression has been slowed due to co-morbidities.   [x] Plan just implemented, too soon to assess goals progression <30days   [] Goals require adjustment due to lack of progress  [] Patient is not progressing as expected and requires additional follow up with physician  [] Other    Persisting Functional Limitations/Impairments:  [x]Sleeping [x]Sitting               [x]Standing [x]Transfers        [x]Walking [x]Kneeling               [x]Stairs [x]Squatting / bending   [x]ADLs [x]Reaching  [x]Lifting  [x]Housework  [x]Driving [x]Job related tasks  []Sports/Recreation []Other:        ASSESSMENT:  See eval  Treatment/Activity Tolerance:  [] Patient able to complete tx [] Patient limited by fatique  [x] Patient limited by pain  [] Patient limited by other medical complications  [] Other:     Prognosis: [x] Good [] Fair  [] Poor    Patient Requires Follow-up: [x] Yes  [] No    Plan for next treatment session:    PLAN: See eval. PT 2-3x / week for 6 weeks. [] Continue per plan of care [] Alter current plan (see comments)  [x] Plan of care initiated [] Hold pending MD visit [] Discharge    Electronically signed by: Uriel Glaser PT, DPT    Note: If patient does not return for scheduled/ recommended follow up visits, this note will serve as a discharge from care along with most recent update on progress.

## 2020-10-05 ENCOUNTER — HOSPITAL ENCOUNTER (OUTPATIENT)
Dept: PHYSICAL THERAPY | Age: 45
Setting detail: THERAPIES SERIES
Discharge: HOME | End: 2020-10-05
Payer: COMMERCIAL

## 2020-10-05 PROCEDURE — 97112 NEUROMUSCULAR REEDUCATION: CPT

## 2020-10-05 PROCEDURE — 97140 MANUAL THERAPY 1/> REGIONS: CPT

## 2020-10-05 PROCEDURE — 97110 THERAPEUTIC EXERCISES: CPT

## 2020-10-07 ENCOUNTER — APPOINTMENT (OUTPATIENT)
Dept: PHYSICAL THERAPY | Age: 45
End: 2020-10-07
Payer: COMMERCIAL

## 2020-10-07 NOTE — FLOWSHEET NOTE
Mitchell Acosta  Phone: [phone]  Fax: [phone]    Physical Therapy Treatment Note/ Progress Report:     Date:  10/7/2020    Patient Name:  Audra Mosqueda    :  1975  MRN: [id number]  Restrictions/Precautions:    Medical/Treatment Diagnosis Information:  · Diagnosis: M54.16 (ICD-10-CM) - Lumbar back pain with radiculopathy affecting lower extremity  · Treatment Diagnosis: Decreased Core/LE Strength  Insurance/Certification information:  PT Insurance Information: [employer]  Physician Information:  Referring Practitioner: Amy Campos DO  Plan of care signed (Y/N): []  Yes  [x]  No     Date of Patient follow up with Physician:  EVGENY     Progress Report: []  Yes  [x]  No     Date Range for reporting period:  Beginnin20  Ending:     Progress report due (10 Rx/or 30 days whichever is less):      Recertification due (POC duration/ or 90 days whichever is less): 20     Visit # Insurance Allowable Auth required? Date Range    []  Yes  []  No      Latex Allergy:  [x]NO      []YES  Preferred Language for Healthcare:   [x]English       []other:    Functional Scale:        Date assessed:  Oswestry; raw score = 28; dysfunction = 56%   20    Pain level: 4-8/10     SUBJECTIVE:  States HEP is going well so far.      OBJECTIVE: See eval      RESTRICTIONS/PRECAUTIONS: Thyroid issues    Exercises/Interventions:     Therapeutic Exercises  (73027) Resistance / level Sets/sec Reps Notes   Bike X 5 minutes       IB/HR 2 x 30\"/2 x 10       // Bars   6\" Fwd Step ups  6\" Lateral Step ups  Glut med walks    X 10 B  X 10 B  Green TB x 2 laps        Cables Multifidi walkouts 7.5# x 5 B                                                Therapeutic Activities (05816)                                   Neuromuscular Re-ed (87239)       // Patricia Bertrand  Airex SLS balance 3 x 15\"   Airex Mini squats  Swiss ball AP, ML, CW/CCW, [place], [place] arms x 10 ea B      Cables  Seated on SB Mid Row, High Row, LPD   V.c/Tactile cues for proper technique                                Manual Intervention (64415)       Hip IR/ER stretches, Manual lumbar traction, Hamstring, Piriformis stretches x 10'                                              Pt. Education:  -pt educated on diagnosis, prognosis and expectations for rehab  -all pt questions were answered    Home Exercise Program:  9/30/20: The following exercises were performed and added to the pt's home program (educated on appropriate frequency, intensity and duration etc.):  Tra, SKTC, Piriformis stretch, multifidi walkouts. Distributed HO and TB    Therapeutic Exercise and NMR EXR  [x]  (81193) Provided verbal/tactile cueing for activities related to strengthening, flexibility, endurance, ROM for improvements in  [x] LE / Lumbar: LE, proximal hip, and core control with self care, mobility, lifting, ambulation. [] UE / Cervical: cervical, postural, scapular, scapulothoracic and UE control with self care, reaching, carrying, lifting, house/yardwork, driving, computer work.  []  (16227) Provided verbal/tactile cueing for activities related to improving balance, coordination, kinesthetic sense, posture, motor skill, proprioception to assist with   [] LE / lumbar: LE, proximal hip, and core control in self care, mobility, lifting, ambulation and eccentric single leg control. [] UE / cervical: cervical, scapular, scapulothoracic and UE control with self care, reaching, carrying, lifting, house/yardwork, driving, computer work.      NMR and Therapeutic Activities:    [x]  (05213 or 27547) Provided verbal/tactile cueing for activities related to improving balance, coordination, kinesthetic sense, posture, motor skill, proprioception and motor activation to allow for proper function of   [x] LE: / Lumbar core, proximal hip and LE with self care and ADLs  [] UE / Cervical: cervical, postural, scapular, scapulothoracic and

## 2020-11-10 ENCOUNTER — TELEPHONE (OUTPATIENT)
Dept: FAMILY MEDICINE CLINIC | Age: 45
End: 2020-11-10

## 2020-11-11 ENCOUNTER — OFFICE VISIT (OUTPATIENT)
Dept: PRIMARY CARE CLINIC | Age: 45
End: 2020-11-11
Payer: COMMERCIAL

## 2020-11-11 LAB
S PYO AG THROAT QL: NEGATIVE
S PYO AG THROAT QL: NORMAL

## 2020-11-11 PROCEDURE — 87880 STREP A ASSAY W/OPTIC: CPT | Performed by: NURSE PRACTITIONER

## 2020-11-11 PROCEDURE — 99211 OFF/OP EST MAY X REQ PHY/QHP: CPT | Performed by: NURSE PRACTITIONER

## 2020-11-11 NOTE — PROGRESS NOTES
Rk Solares received a viral test for COVID-19. They were educated on isolation and quarantine as appropriate. For any symptoms, they were directed to seek care from their PCP, given contact information to establish with a doctor, directed to an urgent care or the emergency room.

## 2020-11-12 LAB — SARS-COV-2: NOT DETECTED

## 2020-11-13 ENCOUNTER — TELEPHONE (OUTPATIENT)
Dept: FAMILY MEDICINE CLINIC | Age: 45
End: 2020-11-13

## 2020-11-13 LAB — S PYO THROAT QL CULT: NORMAL

## 2020-11-13 NOTE — TELEPHONE ENCOUNTER
----- Message from Cayetano Russ sent at 11/13/2020  8:52 AM EST -----  Subject: Results Request    QUESTIONS  Which lab or imaging result is the patient calling about? COVID  Which provider ordered the test? Carlos Casillas   At what location was the test performed? Date the test was performed? 2020-11-11  Additional Information for Provider? PT. had covid test this week and was   told to call back on 11/13/20 for test results   ---------------------------------------------------------------------------  --------------  CALL BACK INFO  What is the best way for the office to contact you? OK to leave message on   voicemail  Preferred Call Back Phone Number?  [phone]

## 2020-11-14 NOTE — TELEPHONE ENCOUNTER
I attempted to call the patient but she didn't answer. I left a message informing her of her lab results. I informed the patient if she still felt ill on Monday to please give me a call and we could do a virtual visit.

## 2020-11-18 ENCOUNTER — OFFICE VISIT (OUTPATIENT)
Dept: PRIMARY CARE CLINIC | Age: 45
End: 2020-11-18
Payer: COMMERCIAL

## 2020-11-18 PROCEDURE — 99211 OFF/OP EST MAY X REQ PHY/QHP: CPT | Performed by: NURSE PRACTITIONER

## 2020-11-18 NOTE — PROGRESS NOTES
Ronel Engle received a viral test for COVID-19. They were educated on isolation and quarantine as appropriate. For any symptoms, they were directed to seek care from their PCP, given contact information to establish with a doctor, directed to an urgent care or the emergency room.

## 2020-11-21 LAB — SARS-COV-2, NAA: NOT DETECTED

## 2020-12-01 ENCOUNTER — VIRTUAL VISIT (OUTPATIENT)
Dept: FAMILY MEDICINE CLINIC | Age: 45
End: 2020-12-01
Payer: COMMERCIAL

## 2020-12-01 PROCEDURE — 1036F TOBACCO NON-USER: CPT | Performed by: FAMILY MEDICINE

## 2020-12-01 PROCEDURE — G8484 FLU IMMUNIZE NO ADMIN: HCPCS | Performed by: FAMILY MEDICINE

## 2020-12-01 PROCEDURE — 99213 OFFICE O/P EST LOW 20 MIN: CPT | Performed by: FAMILY MEDICINE

## 2020-12-01 PROCEDURE — G8428 CUR MEDS NOT DOCUMENT: HCPCS | Performed by: FAMILY MEDICINE

## 2020-12-01 PROCEDURE — G8420 CALC BMI NORM PARAMETERS: HCPCS | Performed by: FAMILY MEDICINE

## 2020-12-01 RX ORDER — BENZONATATE 100 MG/1
100 CAPSULE ORAL 2 TIMES DAILY PRN
Qty: 20 CAPSULE | Refills: 0 | Status: SHIPPED | OUTPATIENT
Start: 2020-12-01 | End: 2020-12-11

## 2020-12-01 ASSESSMENT — ENCOUNTER SYMPTOMS
CHEST TIGHTNESS: 0
SINUS PRESSURE: 0
ABDOMINAL PAIN: 0
SORE THROAT: 0
COUGH: 1
RHINORRHEA: 0
NAUSEA: 0
DIARRHEA: 0
SHORTNESS OF BREATH: 0
VOMITING: 0
WHEEZING: 0

## 2020-12-01 NOTE — PROGRESS NOTES
2020    TELEHEALTH EVALUATION -- Audio/Visual (During XXSSJ-06 public health emergency)    HPI:    Abran Martinez (:  1975) has requested an audio/video evaluation for the following concern(s):    Influenza like syndrome- 2-3 weeks, very fatigued, fever, headaches, no loss of smell and taste, sore throat, ear pain, patient is concerned about lingering fatigue and cough. Feels much improved. Today, chest pain, sob, n, v, d,    Review of Systems   Constitutional: Positive for activity change and fatigue. Negative for fever and unexpected weight change. HENT: Negative for congestion, mouth sores, rhinorrhea, sinus pressure and sore throat. Eyes: Negative for visual disturbance. Respiratory: Positive for cough. Negative for chest tightness, shortness of breath and wheezing. Cardiovascular: Negative for chest pain and leg swelling. Gastrointestinal: Negative for abdominal pain, diarrhea, nausea and vomiting. Musculoskeletal: Positive for arthralgias. Neurological: Positive for weakness. Negative for dizziness, numbness and headaches. Prior to Visit Medications    Medication Sig Taking? Authorizing Provider   benzonatate (TESSALON) 100 MG capsule Take 1 capsule by mouth 2 times daily as needed for Cough Yes Zachery Mckenzie, DO   Naproxen Sodium (ALEVE PO) Take by mouth  Historical Provider, MD   clindamycin (CLEOCIN-T) 1 % external solution Apply one applicator nightly for 7 nights. Patient not taking: Reported on 2020  Princess Kimball MD   clindamycin (CLEOCIN-T) 1 % external solution Apply one applicator nightly for 7 nights. Patient not taking: Reported on 2020  Princess Kimball MD   clindamycin (CLEOCIN) 2 % vaginal cream Place vaginally nightly.   Patient not taking: Reported on 2020  Princess Kimball MD   ibuprofen (ADVIL;MOTRIN) 400 MG tablet Take 1 tablet by mouth 3 times daily as needed for Pain  Patient not taking: Reported on 2020  [facility], [] Abnormal-         Skin:        [x] No significant exanthematous lesions or discoloration noted on facial skin         [] Abnormal-            Psychiatric:       [x] Normal Affect [] No Hallucinations        [] Abnormal-     Other pertinent observable physical exam findings-     ASSESSMENT/PLAN:  1. Influenza-like syndrome  Push fluids  COVID was negative? Cough medication provided  Wash hands  Mask  Use Tylenol  Follow up if not improved. Questions answered      Return in about 3 months (around 3/1/2021). Karel Wood is a 54 y.o. female being evaluated by a Virtual Visit (video visit) encounter to address concerns as mentioned above. A caregiver was present when appropriate. Due to this being a TeleHealth encounter (During [facility]- public health emergency), evaluation of the following organ systems was limited: Vitals/Constitutional/EENT/Resp/CV/GI//MS/Neuro/Skin/Heme-Lymph-Imm. Pursuant to the emergency declaration under the [address] [place] [address] authority and the [employer] Act, this Virtual Visit was conducted with patient's (and/or legal guardian's) consent, to reduce the patient's risk of exposure to COVID-19 and provide necessary medical care. The patient (and/or legal guardian) has also been advised to contact this office for worsening conditions or problems, and seek emergency medical treatment and/or call 911 if deemed necessary. Patient identification was verified at the start of the visit: Yes    Total time spent on this encounter: Not billed by time    Services were provided through a video synchronous discussion virtually to substitute for in-person clinic visit. Patient and provider were located at their individual homes. --Gene Goodell, DO on 12/1/2020 at 1:08 PM    An electronic signature was used to authenticate this note.

## 2020-12-09 ENCOUNTER — TELEPHONE (OUTPATIENT)
Dept: FAMILY MEDICINE CLINIC | Age: 45
End: 2020-12-09

## 2020-12-09 NOTE — TELEPHONE ENCOUNTER
Patient is really concerned because she feels like she has been dealing with Covid like symptoms since the beginning of November. States last week she was feeling like she was on the upswing and now this week she feels like all the symptoms are starting again including chest congestion, loss of taste/smell, and coughing. Patient is concerned she may have pneumonia. Patient was advised if this is an emergency to go to the ER. Patient requesting a returned call from Dr. Kavita Medina to advise.

## 2020-12-09 NOTE — TELEPHONE ENCOUNTER
I don't believe the patient ever tested positive for COVID? Please verify, but if this is the case she needs to be tested. Have patient continue to monitor symptoms and if she is having problems breathing she needs to be evaluated regardless if she is tested positive in November.

## 2020-12-10 NOTE — TELEPHONE ENCOUNTER
Patient called the office back and stated that she tested negative. I scheduled this patient a covid test at [facility].      Please put order in system for patient to have covid test

## 2020-12-11 ENCOUNTER — OFFICE VISIT (OUTPATIENT)
Dept: PRIMARY CARE CLINIC | Age: 45
End: 2020-12-11
Payer: COMMERCIAL

## 2020-12-11 PROCEDURE — 99211 OFF/OP EST MAY X REQ PHY/QHP: CPT | Performed by: NURSE PRACTITIONER

## 2020-12-11 PROCEDURE — G8428 CUR MEDS NOT DOCUMENT: HCPCS | Performed by: NURSE PRACTITIONER

## 2020-12-11 PROCEDURE — G8420 CALC BMI NORM PARAMETERS: HCPCS | Performed by: NURSE PRACTITIONER

## 2020-12-11 NOTE — PATIENT INSTRUCTIONS

## 2020-12-12 LAB — SARS-COV-2, NAA: DETECTED

## 2020-12-22 ENCOUNTER — TELEPHONE (OUTPATIENT)
Dept: FAMILY MEDICINE CLINIC | Age: 45
End: 2020-12-22

## 2020-12-22 DIAGNOSIS — Z20.828 MONO EXPOSURE: Primary | ICD-10-CM

## 2020-12-22 NOTE — TELEPHONE ENCOUNTER
Pt calling requesting an order to test for mono, states she drank after someone who recently tested pos for mono. She also has covd right now but has been feeling sick for over a month now. She would like to have order sent to [facility] on [address] if possible.

## 2020-12-23 NOTE — TELEPHONE ENCOUNTER
Pt called back and is requesting an order for the mono test be sent to [facility] on [address]. She is leaving to go out of town and needs to know asap if she is sick or not.      Order pended

## 2020-12-23 NOTE — TELEPHONE ENCOUNTER
I called the patient and advised her that there is an incubation period for Mono. Also, advised that if she has additional questions, concerns, or symptoms she can schedule a virtual visit.

## 2020-12-30 ENCOUNTER — TELEPHONE (OUTPATIENT)
Dept: FAMILY MEDICINE CLINIC | Age: 45
End: 2020-12-30

## 2020-12-30 NOTE — TELEPHONE ENCOUNTER
Pt would like a return call with the results from her Mono Test. She had the test done at [facility] and the results are in the Media.  Please give pt a call [phone]

## 2021-01-25 ENCOUNTER — TELEPHONE (OUTPATIENT)
Dept: FAMILY MEDICINE CLINIC | Age: 46
End: 2021-01-25

## 2021-01-25 DIAGNOSIS — R30.0 DYSURIA: Primary | ICD-10-CM

## 2021-01-25 NOTE — TELEPHONE ENCOUNTER
Pt want to know if she can get an order fx too [facility] [phone]) on [address] to check her for a UTI. She has burning when urinating, frequency, not emptying bladder all the way, back pain and pain in bladder.  Please give pta call [phone]

## 2021-01-25 NOTE — TELEPHONE ENCOUNTER
Let the patient know I would rather her use a [address] [facility] facility. There have been times when I haven't been notified of the results due to it being another healthcare facility. If she knows where she can have this done please let me know.

## 2021-01-27 DIAGNOSIS — R30.0 DYSURIA: ICD-10-CM

## 2021-01-27 LAB
BACTERIA: ABNORMAL /HPF
BILIRUBIN URINE: NEGATIVE
BLOOD, URINE: NEGATIVE
CLARITY: CLEAR
COLOR: YELLOW
EPITHELIAL CELLS, UA: 1 /HPF (ref 0–5)
GLUCOSE URINE: NEGATIVE MG/DL
HYALINE CASTS: 1 /LPF (ref 0–8)
KETONES, URINE: NEGATIVE MG/DL
LEUKOCYTE ESTERASE, URINE: ABNORMAL
MICROSCOPIC EXAMINATION: YES
NITRITE, URINE: NEGATIVE
PH UA: 6.5 (ref 5–8)
PROTEIN UA: NEGATIVE MG/DL
RBC UA: 0 /HPF (ref 0–4)
SPECIFIC GRAVITY UA: 1 (ref 1–1.03)
URINE TYPE: ABNORMAL
UROBILINOGEN, URINE: 0.2 E.U./DL
WBC UA: 2 /HPF (ref 0–5)

## 2021-01-28 LAB — URINE CULTURE, ROUTINE: NORMAL

## 2021-02-19 ENCOUNTER — OFFICE VISIT (OUTPATIENT)
Dept: GYNECOLOGY | Age: 46
End: 2021-02-19
Payer: COMMERCIAL

## 2021-02-19 VITALS
BODY MASS INDEX: 24.77 KG/M2 | WEIGHT: 139.8 LBS | HEART RATE: 62 BPM | RESPIRATION RATE: 17 BRPM | DIASTOLIC BLOOD PRESSURE: 82 MMHG | SYSTOLIC BLOOD PRESSURE: 126 MMHG | TEMPERATURE: 97.6 F | HEIGHT: 63 IN

## 2021-02-19 DIAGNOSIS — R23.2 HOT FLASHES: ICD-10-CM

## 2021-02-19 DIAGNOSIS — N76.0 BV (BACTERIAL VAGINOSIS): ICD-10-CM

## 2021-02-19 DIAGNOSIS — Z01.419 WELL WOMAN EXAM WITH ROUTINE GYNECOLOGICAL EXAM: Primary | ICD-10-CM

## 2021-02-19 DIAGNOSIS — B96.89 BV (BACTERIAL VAGINOSIS): ICD-10-CM

## 2021-02-19 PROCEDURE — G8484 FLU IMMUNIZE NO ADMIN: HCPCS | Performed by: OBSTETRICS & GYNECOLOGY

## 2021-02-19 PROCEDURE — 99396 PREV VISIT EST AGE 40-64: CPT | Performed by: OBSTETRICS & GYNECOLOGY

## 2021-02-19 RX ORDER — LEVOTHYROXINE SODIUM 75 UG/1
88 TABLET ORAL DAILY
Status: ON HOLD | COMMUNITY
Start: 2021-02-13 | End: 2022-09-02

## 2021-02-19 RX ORDER — LIOTHYRONINE SODIUM 5 UG/1
5 TABLET ORAL DAILY
Status: ON HOLD | COMMUNITY
Start: 2021-02-13 | End: 2022-09-02

## 2021-02-20 ASSESSMENT — ENCOUNTER SYMPTOMS
GASTROINTESTINAL NEGATIVE: 1
EYES NEGATIVE: 1
RESPIRATORY NEGATIVE: 1

## 2021-02-20 NOTE — PROGRESS NOTES
level: Not on file   Occupational History    Not on file   Social Needs    Financial resource strain: Not on file    Food insecurity     Worry: Not on file     Inability: Not on file    Transportation needs     Medical: Not on file     Non-medical: Not on file   Tobacco Use    Smoking status: Never Smoker    Smokeless tobacco: Never Used    Tobacco comment: around a lot of second hand smoke with her dad   Substance and Sexual Activity    Alcohol use:  Yes     Alcohol/week: 0.0 standard drinks     Comment: occasionaly    Drug use: No    Sexual activity: Yes     Partners: Male   Lifestyle    Physical activity     Days per week: Not on file     Minutes per session: Not on file    Stress: Not on file   Relationships    Social connections     Talks on phone: Not on file     Gets together: Not on file     Attends Mor[employer] service: Not on file     Active member of club or organization: Not on file     Attends meetings of clubs or organizations: Not on file     Relationship status: Not on file    Intimate partner violence     Fear of current or ex partner: Not on file     Emotionally abused: Not on file     Physically abused: Not on file     Forced sexual activity: Not on file   Other Topics Concern    Not on file   Social History Narrative    Not on file     Allergies   Allergen Reactions    Ranitidine Hcl Hives     Nervousness and hallucinations    Bactrim Other (See Comments)     Head pain,pt states was out of it    Macrobid [facility] Life Macro]      headache    Sulfa Antibiotics      Head pain    Vicodin [Hydrocodone-Acetaminophen] Nausea And Vomiting     Outpatient Medications Marked as Taking for the 2/19/21 encounter (Office Visit) with Torri Moore MD   Medication Sig Dispense Refill    UNITHROID 75 MCG tablet Take 75 mcg by mouth daily      liothyronine (CYTOMEL) 5 MCG tablet Take 5 mcg by mouth daily      Naproxen Sodium (ALEVE PO) Take by mouth       Family History   Problem Relation Age of Onset    Rheum Arthritis Mother     Irritable Bowel Syndrome Mother    [facility] Migraines Mother     Alcohol Abuse Mother     Asthma Mother     Hypertension Mother     Heart Disease Mother     Alcohol Abuse Father     Cancer Father     Breast Cancer Paternal Aunt 37    Rheum Arthritis Maternal Grandmother     Asthma Maternal Grandmother     Hypertension Maternal Grandmother     Hypertension Maternal Grandfather     Osteoarthritis Neg Hx     Diabetes Neg Hx     Heart Failure Neg Hx     High Cholesterol Neg Hx     Ovarian Cancer Neg Hx     Rashes/Skin Problems Neg Hx     Seizures Neg Hx     Stroke Neg Hx     Thyroid Disease Neg Hx      /82 (Site: Right Upper Arm, Position: Sitting, Cuff Size: Large Adult)   Pulse 62   Temp 97.6 °F (36.4 °C)   Resp 17   Ht 5' 3\" (1.6 m)   Wt 139 lb 12.8 oz (63.4 kg)   LMP 01/13/2018 (Exact Date)   BMI 24.76 kg/m²       Objective:   Physical Exam  Constitutional:       General: She is not in acute distress. Appearance: Normal appearance. She is well-developed and normal weight. She is not diaphoretic. HENT:      Head: Normocephalic. Nose: Nose normal.      Mouth/Throat:      Mouth: Mucous membranes are moist.      Pharynx: Oropharynx is clear. Eyes:      Pupils: Pupils are equal, round, and reactive to light. Neck:      Musculoskeletal: Normal range of motion and neck supple. No neck rigidity. Thyroid: No thyromegaly. Cardiovascular:      Rate and Rhythm: Normal rate and regular rhythm. Heart sounds: Normal heart sounds. No murmur. No friction rub. No gallop. Pulmonary:      Effort: Pulmonary effort is normal. No respiratory distress. Breath sounds: Normal breath sounds. No stridor. No wheezing, rhonchi or rales. Chest:      Chest wall: No tenderness. Breasts:         Right: Normal. No swelling, bleeding, inverted nipple, mass, nipple discharge, skin change or tenderness.          Left: Normal. No swelling, bleeding, inverted nipple, mass, nipple discharge, skin change or tenderness. Abdominal:      General: Abdomen is flat. There is no distension. Palpations: Abdomen is soft. There is no hepatomegaly or mass. Tenderness: There is no abdominal tenderness. There is no guarding or rebound. Hernia: No hernia is present. There is no hernia in the left inguinal area. Genitourinary:     Exam position: Lithotomy position. Pubic Area: No rash. Labia:         Right: No rash, tenderness, lesion or injury. Left: No rash, tenderness, lesion or injury. Urethra: No prolapse, urethral pain, urethral swelling or urethral lesion. Vagina: No signs of injury and foreign body. Vaginal discharge present. No erythema, tenderness, bleeding, lesions or prolapsed vaginal walls. Adnexa: Right adnexa normal and left adnexa normal.        Right: No mass, tenderness or fullness. Left: No mass, tenderness or fullness. Rectum: No anal fissure or external hemorrhoid. Comments: Normal urethral meatus, nl urethra, nl bladder. Musculoskeletal: Normal range of motion. General: No tenderness. Lymphadenopathy:      Cervical: No cervical adenopathy. Skin:     General: Skin is warm and dry. Neurological:      General: No focal deficit present. Mental Status: She is alert and oriented to person, place, and time. Mental status is at baseline. Deep Tendon Reflexes: Reflexes are normal and symmetric. Psychiatric:         Mood and Affect: Mood normal.         Behavior: Behavior normal.         Thought Content: Thought content normal.         Judgment: Judgment normal.         Assessment:      1. Annual  2. Vaginal discharge  3. Hot flashes      Plan:      1. Pap, calcium, exercise, mammogram  2. Cultures, is doing better overall with probiotic  3.  Dori Abdi MD

## 2021-02-22 LAB
GENITAL CULTURE, ROUTINE: ABNORMAL
GENITAL CULTURE, ROUTINE: ABNORMAL
ORGANISM: ABNORMAL

## 2021-02-24 ENCOUNTER — TELEPHONE (OUTPATIENT)
Dept: GYNECOLOGY | Age: 46
End: 2021-02-24

## 2021-02-24 DIAGNOSIS — B96.89 BV (BACTERIAL VAGINOSIS): Primary | ICD-10-CM

## 2021-02-24 DIAGNOSIS — N76.0 BV (BACTERIAL VAGINOSIS): Primary | ICD-10-CM

## 2021-02-24 RX ORDER — CLINDAMYCIN PHOSPHATE 20 MG/G
CREAM VAGINAL
Qty: 1 TUBE | Refills: 0 | OUTPATIENT
Start: 2021-02-24 | End: 2021-03-03

## 2021-02-24 NOTE — TELEPHONE ENCOUNTER
Called and spoke to patient, gave patient her results, informed patient about script that was called into her RX.  Patient said she was interested in discuss estrogen treatment for her hot flashes she has been having

## 2021-02-25 LAB
FINAL REPORT: NORMAL
PRELIMINARY: NORMAL

## 2021-03-02 DIAGNOSIS — Z22.39 CARRIER OF UREAPLASMA UREALYTICUM: Primary | ICD-10-CM

## 2021-03-02 RX ORDER — DOXYCYCLINE HYCLATE 100 MG
100 TABLET ORAL 2 TIMES DAILY
Qty: 20 TABLET | Refills: 0 | OUTPATIENT
Start: 2021-03-02 | End: 2021-03-12

## 2021-03-04 NOTE — TELEPHONE ENCOUNTER
Patient has a couple questions regarding her ureaplasma. Her  went to a urologist and patient is wanting to know who exactly her  should go to and what needs to also be done. She keeps getting it and its getting frustrating. Patient also has a few questions regarding estrogen.      Please contact patient at [phone]

## 2021-03-12 NOTE — TELEPHONE ENCOUNTER
Called and left message to call back. Told her that her  could see any [facility]rologist [facility]. I can refer her to Dr. Km Gilbert for recurrent vaginal infections. I can also call her in some estrogen if she would like.

## 2021-03-15 ENCOUNTER — TELEPHONE (OUTPATIENT)
Dept: FAMILY MEDICINE CLINIC | Age: 46
End: 2021-03-15

## 2021-03-15 NOTE — TELEPHONE ENCOUNTER
I didn't see the patient for a Rheumatological disorder on 12/1/20. It was an URI.   I would like to see the patient before I refer her to a specialist.

## 2021-03-15 NOTE — TELEPHONE ENCOUNTER
----- Message from Sunnyloft Johnathan sent at 3/15/2021  2:42 PM EDT -----  Subject: Referral Request    QUESTIONS   Reason for referral request? Pt would like a referral for a rheumatologist   (Outside [facility]) At [facility]. (No specific provider- just that   location). Has the physician seen you for this condition before? Yes  Select a date? 2020-12-01  Select the physician (PCP or Specialist)? Ramakrishna Erazo   Preferred Specialist (if applicable)? Do you already have an appointment scheduled? No  Additional Information for Provider? Fax number for this location is:   [phone]  ---------------------------------------------------------------------------  --------------  CALL BACK INFO  What is the best way for the office to contact you? OK to leave message on   voicemail  Preferred Call Back Phone Number?  [phone]

## 2021-03-16 NOTE — TELEPHONE ENCOUNTER
The patient should be seen in the office. I'm not familiar with what is going on. She stated that she would call for appointment.

## 2021-03-16 NOTE — TELEPHONE ENCOUNTER
Patient states she has debilitating joint pain to the point she can't even get out of bed for several days. States she has Hashimoto's Disease and that is why she is requesting the referral. Patient thought that Dr. Sheldon Gonzalez already knew this information. Patient is requesting a returned call.

## 2021-04-27 ENCOUNTER — TELEPHONE (OUTPATIENT)
Dept: GYNECOLOGY | Age: 46
End: 2021-04-27

## 2021-04-29 ENCOUNTER — OFFICE VISIT (OUTPATIENT)
Dept: GYNECOLOGY | Age: 46
End: 2021-04-29
Payer: COMMERCIAL

## 2021-04-29 VITALS
RESPIRATION RATE: 17 BRPM | DIASTOLIC BLOOD PRESSURE: 77 MMHG | WEIGHT: 144 LBS | SYSTOLIC BLOOD PRESSURE: 104 MMHG | HEIGHT: 63 IN | TEMPERATURE: 97.5 F | HEART RATE: 61 BPM | BODY MASS INDEX: 25.52 KG/M2

## 2021-04-29 DIAGNOSIS — B96.89 BV (BACTERIAL VAGINOSIS): ICD-10-CM

## 2021-04-29 DIAGNOSIS — R30.0 BURNING WITH URINATION: Primary | ICD-10-CM

## 2021-04-29 DIAGNOSIS — N76.0 BV (BACTERIAL VAGINOSIS): ICD-10-CM

## 2021-04-29 DIAGNOSIS — N89.8 VAGINAL DISCHARGE: ICD-10-CM

## 2021-04-29 PROCEDURE — G8419 CALC BMI OUT NRM PARAM NOF/U: HCPCS | Performed by: OBSTETRICS & GYNECOLOGY

## 2021-04-29 PROCEDURE — 1036F TOBACCO NON-USER: CPT | Performed by: OBSTETRICS & GYNECOLOGY

## 2021-04-29 PROCEDURE — 99213 OFFICE O/P EST LOW 20 MIN: CPT | Performed by: OBSTETRICS & GYNECOLOGY

## 2021-04-29 PROCEDURE — G8427 DOCREV CUR MEDS BY ELIG CLIN: HCPCS | Performed by: OBSTETRICS & GYNECOLOGY

## 2021-04-29 RX ORDER — ESTRADIOL 1 MG/1
1 TABLET ORAL DAILY
Qty: 90 TABLET | Refills: 3 | Status: ON HOLD | OUTPATIENT
Start: 2021-04-29 | End: 2022-09-02

## 2021-04-30 LAB
BACTERIA WET PREP: ABNORMAL
CLUE CELLS: ABNORMAL
EPITHELIAL CELLS WET PREP: ABNORMAL
RBC WET PREP: ABNORMAL
SOURCE WET PREP: ABNORMAL
TRICHOMONAS PREP: ABNORMAL
URINE CULTURE, ROUTINE: NORMAL
WBC WET PREP: ABNORMAL
YEAST WET PREP: ABNORMAL

## 2021-05-02 LAB
GENITAL CULTURE, ROUTINE: ABNORMAL
GENITAL CULTURE, ROUTINE: ABNORMAL
ORGANISM: ABNORMAL

## 2021-05-02 ASSESSMENT — ENCOUNTER SYMPTOMS
RESPIRATORY NEGATIVE: 1
EYES NEGATIVE: 1
GASTROINTESTINAL NEGATIVE: 1

## 2021-05-03 NOTE — PROGRESS NOTES
[address] [facility] [address]  Dept: [phone]  Dept Fax: [phone]  Loc: [phone]     2021    Martha Norman (:  1975) is a 54 y.o. female, here for evaluation of the following medical concerns:    Patient with recurrent vaginal infection. Partner did not get treated for ureaplasm. Patient with vaginal dryness. Urinary Tract Infection        Review of Systems   Constitutional: Negative. HENT: Negative. Eyes: Negative. Respiratory: Negative. Cardiovascular: Negative. Gastrointestinal: Negative. Genitourinary: Negative. Musculoskeletal: Negative. Skin: Negative. Neurological: Negative. Psychiatric/Behavioral: Negative. Prior to Visit Medications    Medication Sig Taking? Authorizing Provider   estradiol (ESTRACE) 1 MG tablet Take 1 tablet by mouth daily Yes Ayo Quinn MD   UNITHROID 75 MCG tablet Take 75 mcg by mouth daily  Historical Provider, MD   liothyronine (CYTOMEL) 5 MCG tablet Take 5 mcg by mouth daily  Historical Provider, MD   Naproxen Sodium (ALEVE PO) Take by mouth  Historical Provider, MD   clindamycin (CLEOCIN-T) 1 % external solution Apply one applicator nightly for 7 nights. Patient not taking: Reported on 2020  Ayo Quinn MD   clindamycin (CLEOCIN-T) 1 % external solution Apply one applicator nightly for 7 nights. Patient not taking: Reported on 2020  Ayo Quinn MD   clindamycin (CLEOCIN) 2 % vaginal cream Place vaginally nightly.   Patient not taking: Reported on 2020  Ayo Quinn MD   ibuprofen (ADVIL;MOTRIN) 400 MG tablet Take 1 tablet by mouth 3 times daily as needed for Pain  Patient not taking: Reported on 2020  Isrrael Harris MD   ARMOUR THYROID 60 MG tablet Take 60 mg by mouth daily   Historical Provider, MD        Allergies   Allergen Reactions    Ranitidine Hcl Hives     Nervousness and hallucinations    Bactrim Other (See Comments)     Head pain,pt states was out of it    Macrobid [facility] Life Macro]      headache    Sulfa Antibiotics      Head pain    Vicodin [Hydrocodone-Acetaminophen] Nausea And Vomiting       Past Medical History:   Diagnosis Date    Abnormal finding on Pap smear, HPV DNA positive 2002    Acid reflux     ASCUS with positive high risk HPV cervical 05/2016    ASCUS with positive high risk HPV cervical 03/23/2017    Bladder disease     Bronchitis     Circulation problem     Dysmenorrhea     HSIL (high grade squamous intraepithelial lesion) on Pap smear     Irregular uterine bleeding     Multiple thyroid nodules 2013    Pericarditis     UTI (urinary tract infection)        Past Surgical History:   Procedure Laterality Date    BLADDER SURGERY      COLPOSCOPY      HYSTERECTOMY  01/24/2018    bilateral salpingectomy. obotic assisted.  LAPAROSCOPY      LAPAROSCOPY  08/17/2010    diagnostic    LEEP  2011    hsil, hpv    MAXILLARY SINUSOTOMY      endoscopic right maxillary    NASAL SEPTUM SURGERY      recon    THYROIDECTOMY  2013    nodules    TONSILLECTOMY         Social History     Socioeconomic History    Marital status:      Spouse name: Not on file    Number of children: Not on file    Years of education: Not on file    Highest education level: Not on file   Occupational History    Not on file   Social Needs    Financial resource strain: Not on file    Food insecurity     Worry: Not on file     Inability: Not on file    Transportation needs     Medical: Not on file     Non-medical: Not on file   Tobacco Use    Smoking status: Never Smoker    Smokeless tobacco: Never Used    Tobacco comment: around a lot of second hand smoke with her dad   Substance and Sexual Activity    Alcohol use:  Yes     Alcohol/week: 0.0 standard drinks     Comment: occasionaly    Drug use: No    Sexual activity: Yes     Partners: Male   Lifestyle    Physical activity Days per week: Not on file     Minutes per session: Not on file    Stress: Not on file   Relationships    Social connections     Talks on phone: Not on file     Gets together: Not on file     Attends Yazidism service: Not on file     Active member of club or organization: Not on file     Attends meetings of clubs or organizations: Not on file     Relationship status: Not on file    Intimate partner violence     Fear of current or ex partner: Not on file     Emotionally abused: Not on file     Physically abused: Not on file     Forced sexual activity: Not on file   Other Topics Concern    Not on file   Social History Narrative    Not on file        Family History   Problem Relation Age of Onset    Rheum Arthritis Mother     Irritable Bowel Syndrome Mother     Migraines Mother     Alcohol Abuse Mother     Asthma Mother     Hypertension Mother     Heart Disease Mother     Alcohol Abuse Father     Cancer Father     Breast Cancer Paternal Aunt 37    Rheum Arthritis Maternal Grandmother     Asthma Maternal Grandmother     Hypertension Maternal Grandmother     Hypertension Maternal Grandfather     Osteoarthritis Neg Hx     Diabetes Neg Hx     Heart Failure Neg Hx     High Cholesterol Neg Hx     Ovarian Cancer Neg Hx     Rashes/Skin Problems Neg Hx     Seizures Neg Hx     Stroke Neg Hx     Thyroid Disease Neg Hx        Vitals:    04/29/21 1141   BP: 104/77   Site: Right Upper Arm   Position: Sitting   Cuff Size: Large Adult   Pulse: 61   Resp: 17   Temp: 97.5 °F (36.4 °C)   Weight: 144 lb (65.3 kg)   Height: 5' 3\" (1.6 m)     Estimated body mass index is 25.51 kg/m² as calculated from the following:    Height as of this encounter: 5' 3\" (1.6 m). Weight as of this encounter: 144 lb (65.3 kg). Physical Exam  Constitutional:       General: She is not in acute distress. Appearance: She is well-developed. She is not diaphoretic. HENT:      Head: Normocephalic.    Eyes:      Pupils: Pupils are equal, round, and reactive to light. Abdominal:      General: There is no distension. Palpations: Abdomen is soft. There is no mass. Tenderness: There is no abdominal tenderness. There is no guarding or rebound. Genitourinary:     Labia:         Right: No rash, tenderness, lesion or injury. Left: No rash, tenderness, lesion or injury. Vagina: No signs of injury and foreign body. Vaginal discharge present. No erythema, tenderness or bleeding. Adnexa:         Right: No mass, tenderness or fullness. Left: No mass, tenderness or fullness. Rectum: No external hemorrhoid. Comments: Normal urethral meatus, nl urethra, nl bladder. Musculoskeletal: Normal range of motion. General: No tenderness. Skin:     General: Skin is warm and dry. Coloration: Skin is not pale. Findings: No erythema or rash. Neurological:      Mental Status: She is alert and oriented to person, place, and time. Psychiatric:         Behavior: Behavior normal.         Thought Content: Thought content normal.         Judgment: Judgment normal.         ASSESSMENT/PLAN:  1. Burning with urinatino  -UC  2. Recurrent vaginitis-cultures done again.  Told her partner needs treatment

## 2021-05-04 DIAGNOSIS — B96.89 BV (BACTERIAL VAGINOSIS): Primary | ICD-10-CM

## 2021-05-04 DIAGNOSIS — N76.0 BV (BACTERIAL VAGINOSIS): Primary | ICD-10-CM

## 2021-05-04 LAB
FINAL REPORT: NORMAL
PRELIMINARY: NORMAL

## 2021-05-04 RX ORDER — METRONIDAZOLE 7.5 MG/G
GEL TOPICAL
Qty: 1 TUBE | Refills: 5 | OUTPATIENT
Start: 2021-05-04 | End: 2021-11-04

## 2021-05-05 DIAGNOSIS — Z22.39 CARRIER OF UREAPLASMA UREALYTICUM: Primary | ICD-10-CM

## 2021-05-05 RX ORDER — DOXYCYCLINE HYCLATE 100 MG
100 TABLET ORAL 2 TIMES DAILY
Qty: 20 TABLET | Refills: 0 | OUTPATIENT
Start: 2021-05-05 | End: 2021-05-15

## 2021-07-06 ENCOUNTER — TELEPHONE (OUTPATIENT)
Dept: FAMILY MEDICINE CLINIC | Age: 46
End: 2021-07-06

## 2021-07-06 NOTE — TELEPHONE ENCOUNTER
Let the patient know that I'm out of the office. She can try to see if Cassandra can see her.   Thank you, Dr. Carmine Rey

## 2021-07-06 NOTE — TELEPHONE ENCOUNTER
----- Message from Queta Jackson sent at 7/6/2021  2:42 PM EDT -----  Subject: Appointment Request    Reason for Call: Ear Problem    QUESTIONS  Type of Appointment? Established Patient  Reason for appointment request? No appointments available during search  Additional Information for Provider? urgent :ears are dry for about 3 or 4   week itchy and painful and is effecting hearing patient need an   appointment , please call patient  ---------------------------------------------------------------------------  --------------  CALL BACK INFO  What is the best way for the office to contact you? OK to leave message on   voicemail  Preferred Call Back Phone Number? [phone]  ---------------------------------------------------------------------------  --------------  SCRIPT ANSWERS  Relationship to Patient? Self  Appointment reason? Symptomatic  Select script based on patient symptoms? Adult Ear/Hearing Problem  Did you have an injury or trauma within the past three days? No  Is your pain affecting your daily activities? No  Are you having fevers (100.4), chills or sweats? No  Are you experiencing new onset hearing loss? Yes   Have you been diagnosed with, awaiting test results for, or told that you   are suspected of having COVID-19 (Coronavirus)? (If patient has tested   negative or was tested as a requirement for work, school, or travel and   not based on symptoms, answer no)? Yes  Did your symptoms begin within the past 14 days or was your positive test   result within the past 14 days? No  Do you currently have flu-like symptoms including fever or chills, cough,   shortness of breath, difficulty breathing, or new loss of taste or smell? No  Have you had close contact with someone with COVID-19 in the last 14 days? No  (Service Expert  click yes below to proceed with [employer] As Usual   Scheduling)?  Yes

## 2021-07-07 ENCOUNTER — TELEPHONE (OUTPATIENT)
Dept: FAMILY MEDICINE CLINIC | Age: 46
End: 2021-07-07

## 2021-07-07 NOTE — TELEPHONE ENCOUNTER
Pt states she wanted to schedule an appt with [facility] in [place] [address]. If she can't get an appt there, she will call and schedule here.

## 2021-08-23 ENCOUNTER — TELEPHONE (OUTPATIENT)
Dept: FAMILY MEDICINE CLINIC | Age: 46
End: 2021-08-23

## 2021-08-23 NOTE — TELEPHONE ENCOUNTER
Pt called to get a referral to the [facility] [address] in [address] [facility]. She develop peanut allergy. This has been going on for sometime now. Her throat swelled up after eating peanuts this weekend at the [employer].  Clara give pt a call when complete [phone]

## 2021-08-24 DIAGNOSIS — T78.40XA ALLERGIC REACTION, INITIAL ENCOUNTER: Primary | ICD-10-CM

## 2021-12-14 ENCOUNTER — TELEPHONE (OUTPATIENT)
Dept: GYNECOLOGY | Age: 46
End: 2021-12-14

## 2021-12-14 NOTE — TELEPHONE ENCOUNTER
Patient says that Dr Sharonda Antonio referred her to a doctor at [facility] that she has had a hard time getting in. She wants to see Dr Sharonda Antonio. Patient feels like it could be multiple infections She wants this to be done before the holidays. Said it's recurring infections. Medications that have been called in the last few times haven't been working. Please advise on scheduling.  Patient can be reached at [phone]

## 2021-12-16 ENCOUNTER — OFFICE VISIT (OUTPATIENT)
Dept: GYNECOLOGY | Age: 46
End: 2021-12-16
Payer: COMMERCIAL

## 2021-12-16 VITALS
BODY MASS INDEX: 23.1 KG/M2 | HEIGHT: 67 IN | OXYGEN SATURATION: 98 % | SYSTOLIC BLOOD PRESSURE: 114 MMHG | RESPIRATION RATE: 17 BRPM | HEART RATE: 70 BPM | WEIGHT: 147.2 LBS | DIASTOLIC BLOOD PRESSURE: 72 MMHG

## 2021-12-16 DIAGNOSIS — R30.0 BURNING WITH URINATION: Primary | ICD-10-CM

## 2021-12-16 DIAGNOSIS — N89.8 VAGINAL DISCHARGE: ICD-10-CM

## 2021-12-16 DIAGNOSIS — R10.2 PELVIC PAIN IN FEMALE: ICD-10-CM

## 2021-12-16 PROCEDURE — G8427 DOCREV CUR MEDS BY ELIG CLIN: HCPCS | Performed by: OBSTETRICS & GYNECOLOGY

## 2021-12-16 PROCEDURE — G8484 FLU IMMUNIZE NO ADMIN: HCPCS | Performed by: OBSTETRICS & GYNECOLOGY

## 2021-12-16 PROCEDURE — 99213 OFFICE O/P EST LOW 20 MIN: CPT | Performed by: OBSTETRICS & GYNECOLOGY

## 2021-12-16 PROCEDURE — 1036F TOBACCO NON-USER: CPT | Performed by: OBSTETRICS & GYNECOLOGY

## 2021-12-16 PROCEDURE — G8420 CALC BMI NORM PARAMETERS: HCPCS | Performed by: OBSTETRICS & GYNECOLOGY

## 2021-12-19 ASSESSMENT — ENCOUNTER SYMPTOMS
EYES NEGATIVE: 1
GASTROINTESTINAL NEGATIVE: 1
RESPIRATORY NEGATIVE: 1

## 2021-12-20 LAB
GENITAL CULTURE, ROUTINE: ABNORMAL
GENITAL CULTURE, ROUTINE: ABNORMAL
ORGANISM: ABNORMAL

## 2021-12-20 NOTE — PROGRESS NOTES
[address] [facility] [address]  Dept: [phone]  Dept Fax: [phone]  Loc: [phone]     2021    Amy Morris (:  1975) is a 51 y.o. female, here for evaluation of the following medical concerns:    Patient is here for vaginal discharge. Patient is having some pain in pelvic area. Vaginal Pain        Review of Systems   Constitutional: Negative. HENT: Negative. Eyes: Negative. Respiratory: Negative. Cardiovascular: Negative. Gastrointestinal: Negative. Genitourinary: Positive for pelvic pain and vaginal pain. Musculoskeletal: Negative. Skin: Negative. Neurological: Negative. Psychiatric/Behavioral: Negative. Date of Birth 1975  Past Medical History:   Diagnosis Date    Abnormal finding on Pap smear, HPV DNA positive     Acid reflux     ASCUS with positive high risk HPV cervical 2016    ASCUS with positive high risk HPV cervical 2017    Bladder disease     Bronchitis     Circulation problem     Dysmenorrhea     HSIL (high grade squamous intraepithelial lesion) on Pap smear     Irregular uterine bleeding     Multiple thyroid nodules 2013    Pericarditis     UTI (urinary tract infection)      Past Surgical History:   Procedure Laterality Date    BLADDER SURGERY      COLPOSCOPY      HYSTERECTOMY  2018    bilateral salpingectomy. obotic assisted.      LAPAROSCOPY      LAPAROSCOPY  2010    diagnostic    LEEP  2011    hsil, hpv    MAXILLARY SINUSOTOMY      endoscopic right maxillary    NASAL SEPTUM SURGERY      recon    THYROIDECTOMY  2013    nodules    TONSILLECTOMY       OB History    Para Term  AB Living   3 3 3     3   SAB IAB Ectopic Molar Multiple Live Births             3      # Outcome Date GA Lbr Jose/2nd Weight Sex Delivery Anes PTL Lv   3 Term  40w0d       MALLORY   2 Term  40w0d       MALLORY   1 Term 36 44w0d MALLORY     Social History     Socioeconomic History    Marital status:      Spouse name: Not on file    Number of children: Not on file    Years of education: Not on file    Highest education level: Not on file   Occupational History    Not on file   Tobacco Use    Smoking status: Never Smoker    Smokeless tobacco: Never Used    Tobacco comment: around a lot of second hand smoke with her dad   Vaping Use    Vaping Use: Never used   Substance and Sexual Activity    Alcohol use: Yes     Alcohol/week: 0.0 standard drinks     Comment: occasionaly    Drug use: No    Sexual activity: Yes     Partners: Male   Other Topics Concern    Not on file   Social History Narrative    Not on file     Social Determinants of Health     Financial Resource Strain:     Difficulty of Paying Living Expenses: Not on file   Food Insecurity:     Worried About Running Out of Food in the Last Year: Not on file    Ramírez of Food in the Last Year: Not on file   Transportation Needs:     Lack of Transportation (Medical): Not on file    Lack of Transportation (Non-Medical):  Not on file   Physical Activity:     Days of Exercise per Week: Not on file    Minutes of Exercise per Session: Not on file   Stress:     Feeling of Stress : Not on file   Social Connections:     Frequency of Communication with Friends and Family: Not on file    Frequency of Social Gatherings with Friends and Family: Not on file    Attends Synagogue Services: Not on file    Active Member of Clubs or Organizations: Not on file    Attends Club or Organization Meetings: Not on file    Marital Status: Not on file   Intimate Partner Violence:     Fear of Current or Ex-Partner: Not on file    Emotionally Abused: Not on file    Physically Abused: Not on file    Sexually Abused: Not on file   Housing Stability:     Unable to Pay for Housing in the Last Year: Not on file    Number of [place] in the Last Year: Not on file    Unstable Housing in the Last Year: Not on file     Allergies   Allergen Reactions    Ranitidine Hcl Hives     Nervousness and hallucinations    Bactrim Other (See Comments)     Head pain,pt states was out of it    Macrobid [facility] Life Macro]      headache    Sulfa Antibiotics      Head pain    Vicodin [Hydrocodone-Acetaminophen] Nausea And Vomiting     Outpatient Medications Marked as Taking for the 12/16/21 encounter (Office Visit) with Dianne Tomlin MD   Medication Sig Dispense Refill    UNITHROID 75 MCG tablet Take 75 mcg by mouth daily      liothyronine (CYTOMEL) 5 MCG tablet Take 5 mcg by mouth daily      Naproxen Sodium (ALEVE PO) Take by mouth       Family History   Problem Relation Age of Onset    Rheum Arthritis Mother     Irritable Bowel Syndrome Mother    [place] Migraines Mother     Alcohol Abuse Mother     Asthma Mother     Hypertension Mother     Heart Disease Mother     Alcohol Abuse Father     Cancer Father     Breast Cancer Paternal Aunt 37    Rheum Arthritis Maternal Grandmother     Asthma Maternal Grandmother     Hypertension Maternal Grandmother     Hypertension Maternal Grandfather     Osteoarthritis Neg Hx     Diabetes Neg Hx     Heart Failure Neg Hx     High Cholesterol Neg Hx     Ovarian Cancer Neg Hx     Rashes/Skin Problems Neg Hx     Seizures Neg Hx     Stroke Neg Hx     Thyroid Disease Neg Hx      /72 (Site: Right Upper Arm, Position: Sitting, Cuff Size: Large Adult)   Pulse 70   Resp 17   Ht 5' 7\" (1.702 m)   Wt 147 lb 3.2 oz (66.8 kg)   LMP 01/13/2018 (Exact Date)   SpO2 98%   BMI 23.05 kg/m²       Prior to Visit Medications    Medication Sig Taking?  Authorizing Provider   UNITHROID 75 MCG tablet Take 75 mcg by mouth daily Yes Historical Provider, MD   liothyronine (CYTOMEL) 5 MCG tablet Take 5 mcg by mouth daily Yes Historical Provider, MD   Naproxen Sodium (ALEVE PO) Take by mouth Yes Historical Provider, MD   estradiol (ESTRACE) 1 MG tablet Take 1 tablet by mouth daily  Yee Cao MD   clindamycin (CLEOCIN-T) 1 % external solution Apply one applicator nightly for 7 nights. Patient not taking: Reported on 9/4/2020  Yee Cao MD   clindamycin (CLEOCIN-T) 1 % external solution Apply one applicator nightly for 7 nights. Patient not taking: Reported on 6/18/2020  Yee Cao MD   clindamycin (CLEOCIN) 2 % vaginal cream Place vaginally nightly. Patient not taking: Reported on 6/18/2020  Yee Cao MD   ibuprofen (ADVIL;MOTRIN) 400 MG tablet Take 1 tablet by mouth 3 times daily as needed for Pain  Patient not taking: Reported on 9/4/2020  Breanna Fitch MD   ARMOUR THYROID 60 MG tablet Take 60 mg by mouth daily   Historical Provider, MD        Allergies   Allergen Reactions    Ranitidine Hcl Hives     Nervousness and hallucinations    Bactrim Other (See Comments)     Head pain,pt states was out of it    Macrobid [facility] Life Macro]      headache    Sulfa Antibiotics      Head pain    Vicodin [Hydrocodone-Acetaminophen] Nausea And Vomiting       Past Medical History:   Diagnosis Date    Abnormal finding on Pap smear, HPV DNA positive 2002    Acid reflux     ASCUS with positive high risk HPV cervical 05/2016    ASCUS with positive high risk HPV cervical 03/23/2017    Bladder disease     Bronchitis     Circulation problem     Dysmenorrhea     HSIL (high grade squamous intraepithelial lesion) on Pap smear     Irregular uterine bleeding     Multiple thyroid nodules 2013    Pericarditis     UTI (urinary tract infection)        Past Surgical History:   Procedure Laterality Date    BLADDER SURGERY      COLPOSCOPY      HYSTERECTOMY  01/24/2018    bilateral salpingectomy. obotic assisted.      LAPAROSCOPY      LAPAROSCOPY  08/17/2010    diagnostic    LEEP  2011    hsil, hpv    MAXILLARY SINUSOTOMY      endoscopic right maxillary    NASAL SEPTUM SURGERY      recon    THYROIDECTOMY  2013    nodules    TONSILLECTOMY         Social History     Socioeconomic History    Marital status:      Spouse name: Not on file    Number of children: Not on file    Years of education: Not on file    Highest education level: Not on file   Occupational History    Not on file   Tobacco Use    Smoking status: Never Smoker    Smokeless tobacco: Never Used    Tobacco comment: around a lot of second hand smoke with her dad   Vaping Use    Vaping Use: Never used   Substance and Sexual Activity    Alcohol use: Yes     Alcohol/week: 0.0 standard drinks     Comment: occasionaly    Drug use: No    Sexual activity: Yes     Partners: Male   Other Topics Concern    Not on file   Social History Narrative    Not on file     Social Determinants of Health     Financial Resource Strain:     Difficulty of Paying Living Expenses: Not on file   Food Insecurity:     Worried About Running Out of Food in the Last Year: Not on file    Ramírez of Food in the Last Year: Not on file   Transportation Needs:     Lack of Transportation (Medical): Not on file    Lack of Transportation (Non-Medical):  Not on file   Physical Activity:     Days of Exercise per Week: Not on file    Minutes of Exercise per Session: Not on file   Stress:     Feeling of Stress : Not on file   Social Connections:     Frequency of Communication with Friends and Family: Not on file    Frequency of Social Gatherings with Friends and Family: Not on file    Attends Mor[employer] Services: Not on file    Active Member of Clubs or Organizations: Not on file    Attends Club or Organization Meetings: Not on file    Marital Status: Not on file   Intimate Partner Violence:     Fear of Current or Ex-Partner: Not on file    Emotionally Abused: Not on file    Physically Abused: Not on file    Sexually Abused: Not on file   Housing Stability:     Unable to Pay for Housing in the Last Year: Not on file    Number of [place] in the Last Year: Not on file    Unstable Housing in the Last Year: Not on file        Family History   Problem Relation Age of Onset    Rheum Arthritis Mother     Irritable Bowel Syndrome Mother    Yohan Pet[place] Migraines Mother     Alcohol Abuse Mother     Asthma Mother     Hypertension Mother     Heart Disease Mother     Alcohol Abuse Father     Cancer Father     Breast Cancer Paternal Aunt 37    Rheum Arthritis Maternal Grandmother     Asthma Maternal Grandmother     Hypertension Maternal Grandmother     Hypertension Maternal Grandfather     Osteoarthritis Neg Hx     Diabetes Neg Hx     Heart Failure Neg Hx     High Cholesterol Neg Hx     Ovarian Cancer Neg Hx     Rashes/Skin Problems Neg Hx     Seizures Neg Hx     Stroke Neg Hx     Thyroid Disease Neg Hx        Vitals:    12/16/21 1650   BP: 114/72   Site: Right Upper Arm   Position: Sitting   Cuff Size: Large Adult   Pulse: 70   Resp: 17   SpO2: 98%   Weight: 147 lb 3.2 oz (66.8 kg)   Height: 5' 7\" (1.702 m)     Estimated body mass index is 23.05 kg/m² as calculated from the following:    Height as of this encounter: 5' 7\" (1.702 m). Weight as of this encounter: 147 lb 3.2 oz (66.8 kg). Physical Exam  Constitutional:       General: She is not in acute distress. Appearance: She is well-developed. She is not diaphoretic. HENT:      Head: Normocephalic. Eyes:      Pupils: Pupils are equal, round, and reactive to light. Abdominal:      General: There is no distension. Palpations: Abdomen is soft. There is no mass. Tenderness: There is no abdominal tenderness. There is no guarding or rebound. Genitourinary:     Labia:         Right: No rash, tenderness, lesion or injury. Left: No rash, tenderness, lesion or injury. Vagina: No signs of injury and foreign body. Vaginal discharge present. No erythema, tenderness or bleeding. Adnexa:         Right: No mass, tenderness or fullness. Left: No mass, tenderness or fullness.         Rectum: No external hemorrhoid. Comments: Normal urethral meatus, nl urethra, nl bladder. Musculoskeletal:         General: No tenderness. Normal range of motion. Skin:     General: Skin is warm and dry. Coloration: Skin is not pale. Findings: No erythema or rash. Neurological:      Mental Status: She is alert and oriented to person, place, and time. Psychiatric:         Behavior: Behavior normal.         Thought Content: Thought content normal.         Judgment: Judgment normal.         ASSESSMENT/PLAN:  1. Burning with urination  - Culture, Urine    2. Vaginal discharge  - Wet prep, genital  - Culture, Genital  - Culture, Ureaplasma/Mycoplasma hominis    3. Pelvic pain in female  - [address]; Future  - US NON OB TRANSVAGINAL; Future  -reviewed hysterectomy. Did have some small amount of endo. Could consider Rhae Camera for this. Patient will consider this.

## 2021-12-22 DIAGNOSIS — B96.89 BV (BACTERIAL VAGINOSIS): Primary | ICD-10-CM

## 2021-12-22 DIAGNOSIS — N76.0 BV (BACTERIAL VAGINOSIS): Primary | ICD-10-CM

## 2021-12-22 LAB
FINAL REPORT: NORMAL
PRELIMINARY: NORMAL

## 2021-12-22 RX ORDER — METRONIDAZOLE 7.5 MG/G
1 GEL TOPICAL NIGHTLY
Qty: 1 EACH | Refills: 0 | OUTPATIENT
Start: 2021-12-22 | End: 2021-12-27

## 2021-12-29 DIAGNOSIS — Z22.39 CARRIER OF UREAPLASMA UREALYTICUM: Primary | ICD-10-CM

## 2021-12-29 RX ORDER — DOXYCYCLINE HYCLATE 100 MG
100 TABLET ORAL 2 TIMES DAILY
Qty: 20 TABLET | Refills: 0 | OUTPATIENT
Start: 2021-12-29 | End: 2022-01-08

## 2022-09-01 ENCOUNTER — APPOINTMENT (OUTPATIENT)
Dept: GENERAL RADIOLOGY | Age: 47
End: 2022-09-01
Payer: COMMERCIAL

## 2022-09-01 ENCOUNTER — APPOINTMENT (OUTPATIENT)
Dept: CT IMAGING | Age: 47
End: 2022-09-01
Payer: COMMERCIAL

## 2022-09-01 ENCOUNTER — HOSPITAL ENCOUNTER (OUTPATIENT)
Age: 47
Setting detail: OBSERVATION
Discharge: HOME | End: 2022-09-02
Attending: EMERGENCY MEDICINE | Admitting: HOSPITALIST
Payer: COMMERCIAL

## 2022-09-01 DIAGNOSIS — R20.0 NUMBNESS OF TONGUE: ICD-10-CM

## 2022-09-01 DIAGNOSIS — Z71.89 GOALS OF CARE, COUNSELING/DISCUSSION: ICD-10-CM

## 2022-09-01 DIAGNOSIS — R20.0 NUMBNESS AROUND MOUTH: ICD-10-CM

## 2022-09-01 DIAGNOSIS — R20.2 TINGLING OF RIGHT UPPER EXTREMITY: Primary | ICD-10-CM

## 2022-09-01 DIAGNOSIS — R47.9 DIFFICULTY WITH SPEECH: ICD-10-CM

## 2022-09-01 LAB
A/G RATIO: 2 (ref 1.1–2.2)
ALBUMIN SERPL-MCNC: 4.9 G/DL (ref 3.4–5)
ALP BLD-CCNC: 110 U/L (ref 40–129)
ALT SERPL-CCNC: 44 U/L (ref 10–40)
ANION GAP SERPL CALCULATED.3IONS-SCNC: 9 MMOL/L (ref 3–16)
AST SERPL-CCNC: 33 U/L (ref 15–37)
BASOPHILS ABSOLUTE: 0.1 K/UL (ref 0–0.2)
BASOPHILS RELATIVE PERCENT: 1 %
BILIRUB SERPL-MCNC: 0.5 MG/DL (ref 0–1)
BUN BLDV-MCNC: 11 MG/DL (ref 7–20)
CALCIUM SERPL-MCNC: 10 MG/DL (ref 8.3–10.6)
CHLORIDE BLD-SCNC: 101 MMOL/L (ref 99–110)
CO2: 28 MMOL/L (ref 21–32)
CREAT SERPL-MCNC: 0.6 MG/DL (ref 0.6–1.1)
EOSINOPHILS ABSOLUTE: 0.2 K/UL (ref 0–0.6)
EOSINOPHILS RELATIVE PERCENT: 3 %
GFR AFRICAN AMERICAN: >60
GFR NON-AFRICAN AMERICAN: >60
GLUCOSE BLD-MCNC: 98 MG/DL
GLUCOSE BLD-MCNC: 98 MG/DL (ref 70–99)
GLUCOSE BLD-MCNC: 99 MG/DL (ref 70–99)
HCT VFR BLD CALC: 40.5 % (ref 36–48)
HEMOGLOBIN: 13.7 G/DL (ref 12–16)
INR BLD: 0.94 (ref 0.87–1.14)
LYMPHOCYTES ABSOLUTE: 2.4 K/UL (ref 1–5.1)
LYMPHOCYTES RELATIVE PERCENT: 29.6 %
MCH RBC QN AUTO: 31.1 PG (ref 26–34)
MCHC RBC AUTO-ENTMCNC: 33.8 G/DL (ref 31–36)
MCV RBC AUTO: 92 FL (ref 80–100)
MONOCYTES ABSOLUTE: 0.7 K/UL (ref 0–1.3)
MONOCYTES RELATIVE PERCENT: 8.4 %
NEUTROPHILS ABSOLUTE: 4.7 K/UL (ref 1.7–7.7)
NEUTROPHILS RELATIVE PERCENT: 58 %
PDW BLD-RTO: 14.1 % (ref 12.4–15.4)
PERFORMED ON: NORMAL
PLATELET # BLD: 249 K/UL (ref 135–450)
PMV BLD AUTO: 9.4 FL (ref 5–10.5)
POTASSIUM REFLEX MAGNESIUM: 4 MMOL/L (ref 3.5–5.1)
PROTHROMBIN TIME: 12.5 SEC (ref 11.7–14.5)
RBC # BLD: 4.41 M/UL (ref 4–5.2)
SODIUM BLD-SCNC: 138 MMOL/L (ref 136–145)
TOTAL PROTEIN: 7.4 G/DL (ref 6.4–8.2)
TROPONIN: <0.01 NG/ML
WBC # BLD: 8.2 K/UL (ref 4–11)

## 2022-09-01 PROCEDURE — 6360000004 HC RX CONTRAST MEDICATION: Performed by: EMERGENCY MEDICINE

## 2022-09-01 PROCEDURE — 99285 EMERGENCY DEPT VISIT HI MDM: CPT

## 2022-09-01 PROCEDURE — 85610 PROTHROMBIN TIME: CPT

## 2022-09-01 PROCEDURE — 70450 CT HEAD/BRAIN W/O DYE: CPT

## 2022-09-01 PROCEDURE — 93005 ELECTROCARDIOGRAM TRACING: CPT | Performed by: EMERGENCY MEDICINE

## 2022-09-01 PROCEDURE — 84484 ASSAY OF TROPONIN QUANT: CPT

## 2022-09-01 PROCEDURE — 80053 COMPREHEN METABOLIC PANEL: CPT

## 2022-09-01 PROCEDURE — 71045 X-RAY EXAM CHEST 1 VIEW: CPT

## 2022-09-01 PROCEDURE — 70498 CT ANGIOGRAPHY NECK: CPT

## 2022-09-01 PROCEDURE — 85025 COMPLETE CBC W/AUTO DIFF WBC: CPT

## 2022-09-01 RX ORDER — ACETAMINOPHEN 325 MG/1
650 TABLET ORAL ONCE
Status: COMPLETED | OUTPATIENT
Start: 2022-09-01 | End: 2022-09-02

## 2022-09-01 RX ADMIN — IOPAMIDOL 75 ML: 755 INJECTION, SOLUTION INTRAVENOUS at 22:43

## 2022-09-02 ENCOUNTER — APPOINTMENT (OUTPATIENT)
Dept: MRI IMAGING | Age: 47
End: 2022-09-02
Payer: COMMERCIAL

## 2022-09-02 VITALS
SYSTOLIC BLOOD PRESSURE: 120 MMHG | HEIGHT: 64 IN | BODY MASS INDEX: 25.56 KG/M2 | OXYGEN SATURATION: 98 % | RESPIRATION RATE: 16 BRPM | TEMPERATURE: 97.3 F | DIASTOLIC BLOOD PRESSURE: 84 MMHG | HEART RATE: 50 BPM | WEIGHT: 149.69 LBS

## 2022-09-02 PROBLEM — R20.2 NUMBNESS AND TINGLING OF RIGHT ARM: Status: ACTIVE | Noted: 2022-09-02

## 2022-09-02 PROBLEM — R20.0 NUMBNESS AND TINGLING OF RIGHT ARM: Status: ACTIVE | Noted: 2022-09-02

## 2022-09-02 LAB
C-REACTIVE PROTEIN: <3 MG/L (ref 0–5.1)
CHOLESTEROL, TOTAL: 196 MG/DL (ref 0–199)
EKG ATRIAL RATE: 65 BPM
EKG DIAGNOSIS: NORMAL
EKG P AXIS: 61 DEGREES
EKG P-R INTERVAL: 164 MS
EKG Q-T INTERVAL: 408 MS
EKG QRS DURATION: 76 MS
EKG QTC CALCULATION (BAZETT): 424 MS
EKG R AXIS: 51 DEGREES
EKG T AXIS: 54 DEGREES
EKG VENTRICULAR RATE: 65 BPM
FOLATE: 16.38 NG/ML (ref 4.78–24.2)
HDLC SERPL-MCNC: 76 MG/DL (ref 40–60)
LDL CHOLESTEROL CALCULATED: 112 MG/DL
LV EF: 58 %
LVEF MODALITY: NORMAL
SEDIMENTATION RATE, ERYTHROCYTE: 9 MM/HR (ref 0–20)
TRIGL SERPL-MCNC: 40 MG/DL (ref 0–150)
TSH REFLEX FT4: 1.18 UIU/ML (ref 0.27–4.2)
VITAMIN B-12: 717 PG/ML (ref 211–911)
VLDLC SERPL CALC-MCNC: 8 MG/DL

## 2022-09-02 PROCEDURE — 93306 TTE W/DOPPLER COMPLETE: CPT

## 2022-09-02 PROCEDURE — 93010 ELECTROCARDIOGRAM REPORT: CPT | Performed by: INTERNAL MEDICINE

## 2022-09-02 PROCEDURE — 6370000000 HC RX 637 (ALT 250 FOR IP): Performed by: EMERGENCY MEDICINE

## 2022-09-02 PROCEDURE — A9577 INJ MULTIHANCE: HCPCS | Performed by: HOSPITALIST

## 2022-09-02 PROCEDURE — 84443 ASSAY THYROID STIM HORMONE: CPT

## 2022-09-02 PROCEDURE — G0378 HOSPITAL OBSERVATION PER HR: HCPCS

## 2022-09-02 PROCEDURE — 85652 RBC SED RATE AUTOMATED: CPT

## 2022-09-02 PROCEDURE — 6360000004 HC RX CONTRAST MEDICATION: Performed by: HOSPITALIST

## 2022-09-02 PROCEDURE — 80061 LIPID PANEL: CPT

## 2022-09-02 PROCEDURE — 92610 EVALUATE SWALLOWING FUNCTION: CPT

## 2022-09-02 PROCEDURE — 94760 N-INVAS EAR/PLS OXIMETRY 1: CPT

## 2022-09-02 PROCEDURE — 86140 C-REACTIVE PROTEIN: CPT

## 2022-09-02 PROCEDURE — 82746 ASSAY OF FOLIC ACID SERUM: CPT

## 2022-09-02 PROCEDURE — 6370000000 HC RX 637 (ALT 250 FOR IP): Performed by: HOSPITALIST

## 2022-09-02 PROCEDURE — 70553 MRI BRAIN STEM W/O & W/DYE: CPT

## 2022-09-02 PROCEDURE — 86038 ANTINUCLEAR ANTIBODIES: CPT

## 2022-09-02 PROCEDURE — 82607 VITAMIN B-12: CPT

## 2022-09-02 PROCEDURE — 83036 HEMOGLOBIN GLYCOSYLATED A1C: CPT

## 2022-09-02 PROCEDURE — 36415 COLL VENOUS BLD VENIPUNCTURE: CPT

## 2022-09-02 RX ORDER — ACETAMINOPHEN 325 MG/1
650 TABLET ORAL EVERY 4 HOURS PRN
Status: DISCONTINUED | OUTPATIENT
Start: 2022-09-02 | End: 2022-09-02 | Stop reason: HOSPADM

## 2022-09-02 RX ORDER — ONDANSETRON 2 MG/ML
4 INJECTION INTRAMUSCULAR; INTRAVENOUS EVERY 6 HOURS PRN
Status: DISCONTINUED | OUTPATIENT
Start: 2022-09-02 | End: 2022-09-02 | Stop reason: HOSPADM

## 2022-09-02 RX ORDER — ATORVASTATIN CALCIUM 80 MG/1
80 TABLET, FILM COATED ORAL DAILY
Status: DISCONTINUED | OUTPATIENT
Start: 2022-09-02 | End: 2022-09-02 | Stop reason: HOSPADM

## 2022-09-02 RX ORDER — ACETAMINOPHEN 650 MG/1
650 SUPPOSITORY RECTAL EVERY 4 HOURS PRN
Status: DISCONTINUED | OUTPATIENT
Start: 2022-09-02 | End: 2022-09-02 | Stop reason: HOSPADM

## 2022-09-02 RX ORDER — ENOXAPARIN SODIUM 100 MG/ML
40 INJECTION SUBCUTANEOUS DAILY
Status: DISCONTINUED | OUTPATIENT
Start: 2022-09-02 | End: 2022-09-02 | Stop reason: HOSPADM

## 2022-09-02 RX ORDER — ONDANSETRON 4 MG/1
4 TABLET, ORALLY DISINTEGRATING ORAL EVERY 8 HOURS PRN
Status: DISCONTINUED | OUTPATIENT
Start: 2022-09-02 | End: 2022-09-02 | Stop reason: HOSPADM

## 2022-09-02 RX ORDER — CLOPIDOGREL BISULFATE 75 MG/1
75 TABLET ORAL DAILY
Qty: 21 TABLET | Refills: 0 | Status: SHIPPED | OUTPATIENT
Start: 2022-09-02

## 2022-09-02 RX ORDER — LEVOTHYROXINE SODIUM 0.07 MG/1
75 TABLET ORAL DAILY
Status: DISCONTINUED | OUTPATIENT
Start: 2022-09-02 | End: 2022-09-02

## 2022-09-02 RX ORDER — LEVOTHYROXINE SODIUM 88 UG/1
88 TABLET ORAL
Status: DISCONTINUED | OUTPATIENT
Start: 2022-09-02 | End: 2022-09-02 | Stop reason: HOSPADM

## 2022-09-02 RX ORDER — ATORVASTATIN CALCIUM 80 MG/1
40 TABLET, FILM COATED ORAL DAILY
Qty: 30 TABLET | Refills: 0 | Status: SHIPPED | OUTPATIENT
Start: 2022-09-03

## 2022-09-02 RX ORDER — ASPIRIN 81 MG/1
81 TABLET ORAL DAILY
Status: DISCONTINUED | OUTPATIENT
Start: 2022-09-02 | End: 2022-09-02 | Stop reason: HOSPADM

## 2022-09-02 RX ORDER — LEVOTHYROXINE SODIUM 88 UG/1
88 TABLET ORAL DAILY
COMMUNITY

## 2022-09-02 RX ORDER — ASPIRIN 300 MG/1
300 SUPPOSITORY RECTAL DAILY
Status: DISCONTINUED | OUTPATIENT
Start: 2022-09-02 | End: 2022-09-02 | Stop reason: HOSPADM

## 2022-09-02 RX ORDER — ASPIRIN 81 MG/1
81 TABLET ORAL DAILY
Qty: 30 TABLET | Refills: 3 | Status: SHIPPED | OUTPATIENT
Start: 2022-09-03

## 2022-09-02 RX ORDER — POLYETHYLENE GLYCOL 3350 17 G/17G
17 POWDER, FOR SOLUTION ORAL DAILY PRN
Status: DISCONTINUED | OUTPATIENT
Start: 2022-09-02 | End: 2022-09-02 | Stop reason: HOSPADM

## 2022-09-02 RX ORDER — LEVOTHYROXINE AND LIOTHYRONINE 38; 9 UG/1; UG/1
60 TABLET ORAL DAILY
Status: DISCONTINUED | OUTPATIENT
Start: 2022-09-02 | End: 2022-09-02

## 2022-09-02 RX ORDER — LIOTHYRONINE SODIUM 5 UG/1
5 TABLET ORAL DAILY
Status: DISCONTINUED | OUTPATIENT
Start: 2022-09-02 | End: 2022-09-02

## 2022-09-02 RX ADMIN — GADOBENATE DIMEGLUMINE 14 ML: 529 INJECTION, SOLUTION INTRAVENOUS at 12:34

## 2022-09-02 RX ADMIN — ACETAMINOPHEN 650 MG: 325 TABLET, FILM COATED ORAL at 00:36

## 2022-09-02 RX ADMIN — LEVOTHYROXINE SODIUM 88 MCG: 0.09 TABLET ORAL at 11:06

## 2022-09-02 RX ADMIN — ASPIRIN 81 MG: 81 TABLET, COATED ORAL at 08:49

## 2022-09-02 ASSESSMENT — PAIN SCALES - GENERAL
PAINLEVEL_OUTOF10: 3
PAINLEVEL_OUTOF10: 0

## 2022-09-02 NOTE — PROGRESS NOTES
Occupational Therapy  Orders received. Chart reviewed. Per RN, pt up Ind in room. Pt with no acute OT needs. If pt has ongoing UE deficits at time of D/C, anticipate need to follow up with OP OT as needed. Will sign off at this time.      Rohit Wood, OTR/L

## 2022-09-02 NOTE — H&P
Hospital Medicine History & Physical      PCP: Mir Tyler DO    Date of Admission: 9/1/2022    Date of Service: Pt seen/examined on 9/2/22 and Admitted OBS with expected LOS less than two midnights     Chief Complaint: Right arm numbness      History Of Present Illness:  49 y.o. female PMH thyroid nodules, previous abnormal Pap findings dysmenorrhea who presented to [facility] in her car around 7:30 PM with her son felt right hand and arm go completely numb tingling then lips and tongue. States she was talking to her boyfriend on the phone but could not get her words out episode lasted 20 minutes felt like her arm was tight no N/V no F/C. Afebrile HR 72, /93. Per ER exam objectively sensation intact  Past Medical History:          Diagnosis Date    Abnormal finding on Pap smear, HPV DNA positive 2002    Acid reflux     ASCUS with positive high risk HPV cervical 05/2016    ASCUS with positive high risk HPV cervical 03/23/2017    Bladder disease     Bronchitis     Circulation problem     Dysmenorrhea     HSIL (high grade squamous intraepithelial lesion) on Pap smear     Irregular uterine bleeding     Multiple thyroid nodules 2013    Pericarditis     UTI (urinary tract infection)        Past Surgical History:          Procedure Laterality Date    BLADDER SURGERY      COLPOSCOPY      HYSTERECTOMY  01/24/2018    bilateral salpingectomy. obotic assisted. LAPAROSCOPY      LAPAROSCOPY  08/17/2010    diagnostic    LEEP  2011    hsil, hpv    MAXILLARY SINUSOTOMY      endoscopic right maxillary    NASAL SEPTUM SURGERY      recon    THYROIDECTOMY  2013    nodules    TONSILLECTOMY         Medications Prior to Admission:      Prior to Admission medications    Medication Sig Start Date End Date Taking?  Authorizing Provider   estradiol (ESTRACE) 1 MG tablet Take 1 tablet by mouth daily 4/29/21 5/29/21  Fede Shanks MD   UNITHROID 75 MCG tablet Take 75 mcg by mouth daily 2/13/21   Historical Provider, Hx     Rashes/Skin Problems Neg Hx     Seizures Neg Hx     Stroke Neg Hx     Thyroid Disease Neg Hx        REVIEW OF SYSTEMS COMPLETED:   Pertinent positives as noted in the HPI. All other systems reviewed and negative. PHYSICAL EXAM PERFORMED:    /72   Pulse 56   Temp 97.4 °F (36.3 °C) (Oral)   Resp 16   Ht 5' 4\" (1.626 m)   Wt 150 lb 5.7 oz (68.2 kg)   LMP 01/13/2018 (Exact Date)   SpO2 98%   BMI 25.81 kg/m²     General appearance:  sleeping   HEENT:  mucosa moist     Neck: Supple, full range of motion. No JVD Trachea midline. Respiratory:  Normal respiratory effort. Clear to auscultation, bilaterally   Cardiovascular:  Regular rate and rhythm with normal S1/S2 without murmurs, rubs or gallops. Abdomen: Soft, non-tender, non-distended with normal bowel sounds. Musculoskeletal:  No clubbing, cyanosis or edema bilaterally. Full range of motion without deformity. Skin: Skin color, texture, turgor normal.  No rashes or lesions.   Neurologic:   Cranial nerves: II-XII intact, grossly non-focal.  Psychiatric:  Alert and oriented, thought content appropriate, normal insight  Peripheral Pulses: +2 palpable, equal bilaterally       Labs:     Recent Labs     09/01/22 2156   WBC 8.2   HGB 13.7   HCT 40.5        Recent Labs     09/01/22 2156      K 4.0      CO2 28   BUN 11   CREATININE 0.6   CALCIUM 10.0     Recent Labs     09/01/22 2156   AST 33   ALT 44*   BILITOT 0.5   ALKPHOS 110     Recent Labs     09/01/22 2156   INR 0.94     Recent Labs     09/01/22  2156   [address] <0.01       Urinalysis:      Lab Results   Component Value Date/Time    NITRU Negative 01/27/2021 04:18 PM    WBCUA 2 01/27/2021 04:18 PM    BACTERIA RARE 01/27/2021 04:18 PM    RBCUA 0 01/27/2021 04:18 PM    BLOODU Negative 01/27/2021 04:18 PM    SPECGRAV 1.005 01/27/2021 04:18 PM    GLUCOSEU Negative 01/27/2021 04:18 PM    GLUCOSEU NEGATIVE 06/11/2010 03:30 PM       Radiology:     CTA HEAD NECK W CONTRAST Final Result   Unremarkable CTA of the head and neck. This scan was analyzed using Viz. ai contact LVO. Identification of suspected   findings is not for diagnostic use beyond notification. Viz LVO is limited to   analysis of imaging data and should not be used in-lieu of full patient   evaluation or relied upon to make or confirm diagnosis. CT HEAD WO CONTRAST   Final Result   Addendum (preliminary) 1 of 1   ADDENDUM:   Stroke CT head results were verbally communicated by [facility] to Dr. Tod Zaragoza on   09/01/2022 at 11:03 p.m. Final   No acute intracranial abnormality. The findings were sent to the Radiology Results [address] at 11:01   pm on 9/1/2022 to be communicated to a licensed caregiver. XR CHEST PORTABLE   Final Result   No acute cardiopulmonary abnormality. Consults:    IP CONSULT TO PHARMACY  PHARMACY TO CHANGE BASE FLUIDS    ASSESSMENT:      Arm numbness  Expressive aphasia  Possible psoriatic arthritis  --stroke padilla . Echo MRI brain   Consult neurology  DEVIN ordered  Aspirin   Atorvastatin high dose     Neuropathy  --she isnt sure if that's what is doing it but was swimming in pool the other day when had it today went to arm and her lips         DVT Prophylaxis: lovenox  Diet: Diet NPO  Code Status: Prior    PT/OT Eval Status: independent     Dispo - return to home setting        Kylee Rasmussen MD    Thank you Sharonda Calabrese DO for the opportunity to be involved in this patient's care. If you have any questions or concerns please feel free to contact me at [phone].

## 2022-09-02 NOTE — FLOWSHEET NOTE
Pt came to the er tonight because she was driving down the road and her rt arm and hand started going numb and tingling , speech garbled not making any sence. Pt drove to the er , speech clear in tr , equal  , m.a.e.e pt states a couple of episode this wk like this that went away , better per pt but not back to her normal self , skin w/d pink a/ox4 , walked to tr without any problems.

## 2022-09-02 NOTE — ED PROVIDER NOTES
[facility]      Pt Name: Carlee Aguero  MRN: [id number]  [place] 1975  Date of evaluation: 9/1/2022  Provider: Arvind Farrell MD    CHIEF COMPLAINT     I was in my car around 12pm with my son when I felt my right hand and arm go completely tight/tingly/numb and then my lips my tongue went numb. I was talking to my  boyfriend on the phone and I couldn't get my words out. HISTORY OF PRESENT ILLNESS  (Location/Symptom, Timing/Onset,Context/Setting, Quality, Duration, Modifying Factors, Severity). Note limiting factors. Chief Complaint   Patient presents with    Other     Rt arm and hand went numb and tingling , sheeh unable to under stand approx 1 hr pta , better now but not back to normal per pt       Carlee Aguero is a 49 y.o. female who presents to the emergency department secondary to concern for multiple symptoms as noted above. At the time of my assessment she states she still feels awkward with her arm feeling like it is tight but her other symptoms have resolved. She states that the symptoms lasted for about 20 minutes. She states that she drinks alcohol a couple of times a week, couple glasses of wine. Denies smoking. Denies any other drug use. No known sick contacts. Denies any recent nausea, vomiting, fever, chills. Reports she has been eating and drinking well. No changes in her urine or bowel movement output. Past medical history noted below. Aside from what is stated above denies any other symptoms or modifying factors. Nursing Notes reviewed. REVIEW OF SYSTEMS  (2-9 systems for level 4, 10 or more for level 5)   Review of Systems Pertinent positive and negative findings as documented in the HPI; otherwise all other systems were reviewed and were negative.      PAST MEDICAL HISTORY     Past Medical History:   Diagnosis Date    Abnormal finding on Pap smear, HPV DNA positive 2002    Acid reflux ASCUS with positive high risk HPV cervical 05/2016    ASCUS with positive high risk HPV cervical 03/23/2017    Bladder disease     Bronchitis     Circulation problem     Dysmenorrhea     HSIL (high grade squamous intraepithelial lesion) on Pap smear     Irregular uterine bleeding     Multiple thyroid nodules 2013    Pericarditis     UTI (urinary tract infection)        SURGICALHISTORY       Past Surgical History:   Procedure Laterality Date    BLADDER SURGERY      COLPOSCOPY      HYSTERECTOMY  01/24/2018    bilateral salpingectomy. obotic assisted. LAPAROSCOPY      LAPAROSCOPY  08/17/2010    diagnostic    LEEP  2011    hsil, hpv    MAXILLARY SINUSOTOMY      endoscopic right maxillary    NASAL SEPTUM SURGERY      recon    THYROIDECTOMY  2013    nodules    TONSILLECTOMY       CURRENT MEDICATIONS       Previous Medications    ARMOUR THYROID 60 MG TABLET    Take 60 mg by mouth daily     CLINDAMYCIN (CLEOCIN) 2 % VAGINAL CREAM    Place vaginally nightly. CLINDAMYCIN (CLEOCIN-T) 1 % EXTERNAL SOLUTION    Apply one applicator nightly for 7 nights. CLINDAMYCIN (CLEOCIN-T) 1 % EXTERNAL SOLUTION    Apply one applicator nightly for 7 nights.     ESTRADIOL (ESTRACE) 1 MG TABLET    Take 1 tablet by mouth daily    IBUPROFEN (ADVIL;MOTRIN) 400 MG TABLET    Take 1 tablet by mouth 3 times daily as needed for Pain    LIOTHYRONINE (CYTOMEL) 5 MCG TABLET    Take 5 mcg by mouth daily    NAPROXEN SODIUM (ALEVE PO)    Take by mouth    UNITHROID 75 MCG TABLET    Take 75 mcg by mouth daily      ALLERGIES     Ranitidine hcl, Bactrim, Macrobid [nitrofurantoin monohyd macro], Sulfa antibiotics, and Vicodin [hydrocodone-acetaminophen]  FAMILY HISTORY       Family History   Problem Relation Age of Onset    Rheum Arthritis Mother     Irritable Bowel Syndrome Mother     Migraines Mother     Alcohol Abuse Mother     Asthma Mother     Hypertension Mother     Heart Disease Mother     Alcohol Abuse Father     Cancer Father     Breast Cancer Paternal Aunt 37    Rheum Arthritis Maternal Grandmother     Asthma Maternal Grandmother     Hypertension Maternal Grandmother     Hypertension Maternal Grandfather     Osteoarthritis Neg Hx     Diabetes Neg Hx     Heart Failure Neg Hx     High Cholesterol Neg Hx     Ovarian Cancer Neg Hx     Rashes/Skin Problems Neg Hx     Seizures Neg Hx     Stroke Neg Hx     Thyroid Disease Neg Hx      SOCIAL HISTORY       Social History     Socioeconomic History    Marital status:    Tobacco Use    Smoking status: Never    Smokeless tobacco: Never    Tobacco comments:     around a lot of second hand smoke with her dad   Vaping Use    Vaping Use: Never used   Substance and Sexual Activity    Alcohol use: Yes     Alcohol/week: 0.0 standard drinks     Comment: occasionaly    Drug use: No    Sexual activity: Yes     Partners: Male     SCREENINGS   NIH Stroke Scale  Interval: Baseline  Level of Consciousness (1a): Alert  LOC Questions (1b): Answers both correctly  LOC Commands (1c): Performs both tasks correctly  Best Gaze (2): Normal  Visual (3): No visual loss  Facial Palsy (4): Normal symmetrical movement  Motor Arm, Left (5a): No drift  Motor Arm, Right (5b): No drift  Motor Leg, Left (6a): No drift  Motor Leg, Right (6b): No drift  Limb Ataxia (7): Absent  Sensory (8): Normal  Best Language (9): No aphasia  Dysarthria (10): Normal  Extinction and Inattention (11): No abnormality  Total: 0Glasgow Coma Scale  Eye Opening: Spontaneous  Best Verbal Response: Oriented  Best Motor Response: Obeys commands  Morrill Coma Scale Score: 15    PHYSICAL EXAM  (up to 7 for level 4, 8 or more for level 5)   INITIAL VITALS: BP: (!) 140/93, Temp: 97.4 °F (36.3 °C), Heart Rate: 72, Resp: 18, SpO2: 97 %   Physical Exam  Vitals and nursing note reviewed. Constitutional:       General: She is not in acute distress. Appearance: She is not toxic-appearing or diaphoretic. HENT:      Head: Normocephalic and atraumatic.       Right Ear: External ear normal.      Left Ear: External ear normal.   Eyes:      General: No visual field deficit or scleral icterus. Right eye: No discharge. Left eye: No discharge. Extraocular Movements: Extraocular movements intact. Conjunctiva/sclera: Conjunctivae normal.      Pupils: Pupils are equal, round, and reactive to light. Comments: Wearing sunglasses   Neck:      Trachea: No tracheal deviation. Cardiovascular:      Rate and Rhythm: Normal rate and regular rhythm. Pulses: Normal pulses. Heart sounds: Normal heart sounds. No murmur heard. No friction rub. No gallop. Pulmonary:      Effort: Pulmonary effort is normal. No respiratory distress. Abdominal:      Tenderness: There is no abdominal tenderness. There is no guarding or rebound. Musculoskeletal:      Cervical back: Normal range of motion. No rigidity. Right lower leg: No edema. Left lower leg: No edema. Skin:     General: Skin is dry. Capillary Refill: Capillary refill takes less than 2 seconds. Neurological:      General: No focal deficit present. Mental Status: She is alert and oriented to person, place, and time. GCS: GCS eye subscore is 4. GCS verbal subscore is 5. GCS motor subscore is 6. Cranial Nerves: Cranial nerves are intact. No cranial nerve deficit, dysarthria or facial asymmetry. Sensory: Sensation is intact. Motor: No weakness. Coordination: Coordination is intact. Gait: Gait is intact. Comments: Objectively her sensation is intact in bilateral upper and lower extremity. Endorses subjective \"feeling a little tingly\" on the right extremity, does not follow dermatomal patterns     DIAGNOSTIC RESULTS   EKG: interpreted by the Emergency Department Physician who either signs or Co-signs this chart in the absence of a cardiologist.  Indication: Tingling  Interpretation: Rate 65, rhythm sinus, axis normal.  DE/QRS/QTc within normal limits.   No T/ST changes consistent with acute ischemia. Comparison to prior EKG from January 18, 2018 shows she was bradycardic at that time with a rate of 57 and otherwise no acute ischemic changes are noted. RADIOLOGY:   Interpretation per Radiologist below, if available at the time of this note:  CTA HEAD NECK W CONTRAST   Final Result   Unremarkable CTA of the head and neck. This scan was analyzed using Viz. ai contact LVO. Identification of suspected   findings is not for diagnostic use beyond notification. Viz LVO is limited to   analysis of imaging data and should not be used in-lieu of full patient   evaluation or relied upon to make or confirm diagnosis. CT HEAD WO CONTRAST   Final Result   No acute intracranial abnormality. The findings were sent to the Radiology Results [id number] at 11:01   pm on 9/1/2022 to be communicated to a licensed caregiver. XR CHEST PORTABLE   Final Result   No acute cardiopulmonary abnormality. LABS:  Labs Reviewed   COMPREHENSIVE METABOLIC PANEL W/ REFLEX TO MG FOR LOW K - Abnormal; Notable for the following components:       Result Value    ALT 44 (*)     All other components within normal limits   POCT GLUCOSE - Normal   CBC WITH AUTO DIFFERENTIAL   TROPONIN   PROTIME-INR   POCT GLUCOSE       EMERGENCY DEPARTMENT COURSE and DIFFERENTIAL DIAGNOSIS/MDM:   Patient was given the following medications:  Orders Placed This Encounter   Medications    iopamidol (ISOVUE-370) 76 % injection 75 mL       CONSULTS:  IP CONSULT TO PHARMACY  PHARMACY TO CHANGE BASE FLUIDS    INITIAL VITALS: BP: (!) 140/93, Temp: 97.4 °F (36.3 °C), Heart Rate: 72, Resp: 18, SpO2: 97 %   RECENT VITALS:  BP: (!) 135/93,Temp: 97.4 °F (36.3 °C), Heart Rate: 68, Resp: 18, SpO2: 97 %     Is this patient to be included in the SEP-1 Core Measure due to severe sepsis or septic shock?    No   Exclusion criteria - the patient is NOT to be included for SEP-1 Core Measure due to:  2+ SIRS criteria are not met    Luis Manuel Gannon is a 49 y.o. female who presents to the emergency department secondary to concern for symptoms as noted in HPI. On arrival she is awake, alert, oriented. NIH score is 0. Test of skew was negative. She walks with a steady and well correlated gait without any truncal ataxia noted. Physical exam also reassuring as noted above. A peripheral IV was placed, labs were ordered along with imaging. EKG without acute ischemic changes. On reassessment discussed results of her EKG, labs, imaging. Discussed recommendation for admission due to concern for potential stroke though at this time she remains with an NIH of 0. She did endorse continued headache at which time I ordered Tylenol. Repeat vitals remained hemodynamically stable. At that time she also does tell me she has a history of neuropathy but this week has been \"worse and different\" she also states that she was recently told she may have psoriatic arthritis (after seeing dermatology) which is why she had some imaging done earlier today at [facility] which was found on review of medical record. Discussed goals of care, she would want to be full code with both CPR and intubation as needed. Contacted hospitalist, Dr. Johana Jackson, for admission who kindly accepted. CRITICAL CARE TIME   Due to the immediate potential for life-threatening deterioration due to potential stroke, I spent 31 minutes providing critical care. There was a high probability of clinically significant/life threatening deterioration in the patient's condition which required my urgent intervention. This time excludes time spent performing procedures but includes time spent on direct patient care, history retrieval, review of the chart, and discussions with patient, family, and consultant(s). FINAL IMPRESSION      1. Tingling of right upper extremity    2. Numbness around mouth    3. Numbness of tongue    4. Difficulty with speech    5.  Goals of

## 2022-09-02 NOTE — CONSULTS
Neurology Consult Note  Reason for Consult: numbness extremities, r/o CVA    Chief complaint: numbness, trouble talking    Dr Ida Dominguez MD asked me to see Crissy Alcazar in consultation for evaluation of numbness extremities, r/o CVA    History of Present Illness:  Crissy Alcazar is a 49 y.o. female who presents with numbness, trouble talking. I obtained my information via interview w/ the patient, supplemented by chart review. The patient says that on Monday she was swimming and had a 20 minute episode of R hand numbness. Yesterday she was in the care w/ her son and said that she started having trouble getting or words out. She wasn't making any sense. Her R hand again went numb though this time it progressed to her entire RUE. She started to panic. Her symptoms worsening. She then felt some numbness to her lips and her tongue. She called her  and was slurring her words, thinking she may have had a stroke. She ended up driving herself to the ED. BP OK. No fevers. CT head and CTA head/neck negative. Neurologically she was intact. She says that second event lasted for 40-45 minutes. She denies ever experiencing anything like this in the past.  Her mother had a hx of stroke in her 76s. No other family hx of neurologic or blood disorders. She says that she does occasionally get a headache like a migraine but never any other neurologic symptoms w/ it. She mentions that she gets really hot/cold sometimes. She follows w/ both Dermatology and Rheumatology outpatient for diffuse arthralgia, pain, numbness (says she usually has neuropathy in her hands/feet). It looks like she may have been diagnosed w/ fibromyalgia.      Medical History:  Past Medical History:   Diagnosis Date    Abnormal finding on Pap smear, HPV DNA positive 2002    Acid reflux     ASCUS with positive high risk HPV cervical 05/2016    ASCUS with positive high risk HPV cervical 03/23/2017 Bladder disease     Bronchitis     Circulation problem     Dysmenorrhea     HSIL (high grade squamous intraepithelial lesion) on Pap smear     Irregular uterine bleeding     Multiple thyroid nodules 2013    Pericarditis     UTI (urinary tract infection)      Past Surgical History:   Procedure Laterality Date    BLADDER SURGERY      COLPOSCOPY      HYSTERECTOMY  01/24/2018    bilateral salpingectomy. obotic assisted.      LAPAROSCOPY      LAPAROSCOPY  08/17/2010    diagnostic    LEEP  2011    hsil, hpv    MAXILLARY SINUSOTOMY      endoscopic right maxillary    NASAL SEPTUM SURGERY      recon    THYROIDECTOMY  2013    nodules    TONSILLECTOMY       Scheduled Meds:   enoxaparin  40 mg SubCUTAneous Daily    aspirin  81 mg Oral Daily    Or    aspirin  300 mg Rectal Daily    atorvastatin  80 mg Oral Daily    levothyroxine  88 mcg Oral QAM AC     Medications Prior to Admission:   levothyroxine (SYNTHROID) 88 MCG tablet, Take 88 mcg by mouth Daily Takes Unithyroid brand  estradiol (ESTRACE) 1 MG tablet, Take 1 tablet by mouth daily  UNITHROID 75 MCG tablet, Take 88 mcg by mouth daily  liothyronine (CYTOMEL) 5 MCG tablet, Take 5 mcg by mouth daily  Naproxen Sodium (ALEVE PO), Take by mouth  clindamycin (CLEOCIN-T) 1 % external solution, Apply one applicator nightly for 7   ibuprofen (ADVIL;MOTRIN) 400 MG tablet, Take 1 tablet by mouth 3 times daily as needed for Pain (Patient not taking: Reported on 9/4/2020)  ARMOUR THYROID 60 MG tablet, Take 60 mg by mouth daily     Allergies   Allergen Reactions    Ranitidine Hcl Hives     Nervousness and hallucinations    Bactrim Other (See Comments)     Head pain,pt states was out of it    Macrobid [facility] Life Macro]      headache    Sulfa Antibiotics      Head pain    Vicodin [Hydrocodone-Acetaminophen] Nausea And Vomiting     Family History   Problem Relation Age of Onset    Rheum Arthritis Mother     Irritable Bowel Syndrome Mother     Migraines Mother     Alcohol Abuse Mother     Asthma Mother     Hypertension Mother     Heart Disease Mother     Alcohol Abuse Father     Cancer Father     Breast Cancer Paternal Aunt 37    Rheum Arthritis Maternal Grandmother     Asthma Maternal Grandmother     Hypertension Maternal Grandmother     Hypertension Maternal Grandfather     Osteoarthritis Neg Hx     Diabetes Neg Hx     Heart Failure Neg Hx     High Cholesterol Neg Hx     Ovarian Cancer Neg Hx     Rashes/Skin Problems Neg Hx     Seizures Neg Hx     Stroke Neg Hx     Thyroid Disease Neg Hx      Social History     Tobacco Use   Smoking Status Never   Smokeless Tobacco Never   Tobacco Comments    around a lot of second hand smoke with her dad     Social History     Substance and Sexual Activity   Drug Use No     Social History     Substance and Sexual Activity   Alcohol Use Yes    Alcohol/week: 0.0 standard drinks    Comment: occasionaly     ROS  Constitutional- No weight loss or fevers  Eyes- No diplopia. No photophobia. Ears/nose/throat- No dysphagia. No Dysarthria  Cardiovascular- No palpitations. No chest pain  Respiratory- No dyspnea. No Cough  Gastrointestinal- No Abdominal pain. No Vomiting. Genitourinary- No incontinence. No urinary retention  Musculoskeletal- No myalgia. No arthralgia  Skin- No rash. No easy bruising. Psychiatric- No depression. No anxiety  Endocrine- No diabetes. No thyroid issues. Hematologic- No bleeding difficulty. No fatigue  Neurologic- No weakness. No Headache. Exam  Blood pressure (!) 131/91, pulse 56, temperature 97.2 °F (36.2 °C), temperature source Oral, resp. rate 18, height 5' 4\" (1.626 m), weight 149 lb 11.1 oz (67.9 kg), last menstrual period 01/13/2018, SpO2 98 %, not currently breastfeeding. Constitutional    Vital signs: BP, HR, and RR reviewed   General alert, no distress  Eyes: unable to visualize the fundi  Cardiovascular: no peripheral edema. Psychiatric: cooperative with examination, no psychotic behavior noted.   Neurologic  Mental status:   orientation to person, place, time. General fund of knowledge grossly intact   Memory grossly intact   Attention intact as able to attend well to the exam     Language fluent in conversation   Comprehension intact; follows simple commands  Cranial nerves:   CN2: visual fields full   CN 3,4,6: extraocular muscles intact  CN5: facial sensation symmetric   CN7: face symmetric without dysarthria  CN8: hearing grossly intact  CN9: palate elevated symmetrically  CN11: trap full strength on shoulder shrug  CN12: tongue midline with protrusion  Strength: good strength in all 4 extremities   Deep tendon reflexes: normal in all 4 extremities  Sensory: light touch intact in all 4 extremities. Cerebellar/coordination: finger nose finger normal without ataxia  Tone: normal in all 4 extremities  Gait: deferred at this time for comfort. Labs  Glucose 99  Na 138  K 4.0  BUN 11  Cr 0.6    ALT 44  AST 33    WBC 8.2K  Hg 13.7  Platelets 007    Studies  CT head w/o 9/1/22, independently reviewed  Impression   No acute intracranial abnormality. CTA head/neck 9/1/22, independently reviewed  Impression   Unremarkable CTA of the head and neck. Impression  The patient had a transient episode of R hand numbness earlier in the week. A few days later she had a longer episode of aphasia/dysarthria, RUE numbness, and mouth/tongue numbness. Currently she has no focal neurologic deficits. Should consider TIA/stroke. Neuropathy. Fibromyalgia. Recommendations  MRI brain w/o. If no other intracranial etiology is identified, then complete stroke/TIA workup - TTE w/ bubble study, HgA1c, lipid panel. Would probably treat w/ DAPT for 3 weeks. Telemetry. Further recommendations may depend on imaging.       Saba Mane NP  [address] [place] [address] [id number] Neurology    A copy of this note was provided for Dr Lee Goodpasture, MD

## 2022-09-02 NOTE — PLAN OF CARE
Problem: Discharge Planning  Goal: Discharge to home or other facility with appropriate resources  Outcome: Progressing  Flowsheets (Taken 9/2/2022 0552)  Discharge to home or other facility with appropriate resources:   Identify barriers to discharge with patient and caregiver   Arrange for needed discharge resources and transportation as appropriate   Identify discharge learning needs (meds, wound care, etc)     Problem: Safety - Adult  Goal: Free from fall injury  Outcome: Progressing  Flowsheets (Taken 9/2/2022 0552)  Free From Fall Injury: Instruct family/caregiver on patient safety     Problem: ABCDS Injury Assessment  Goal: Absence of physical injury  Outcome: Progressing  Flowsheets (Taken 9/2/2022 0552)  Absence of Physical Injury: Implement safety measures based on patient assessment     Problem: Neurosensory - Adult  Goal: Achieves stable or improved neurological status  Outcome: Progressing  Flowsheets (Taken 9/2/2022 0515)  Achieves stable or improved neurological status: Assess for and report changes in neurological status  Goal: Absence of seizures  Outcome: Progressing  Goal: Remains free of injury related to seizures activity  Outcome: Progressing  Goal: Achieves maximal functionality and self care  Outcome: Progressing  Flowsheets (Taken 9/2/2022 0515)  Achieves maximal functionality and self care: Monitor swallowing and airway patency with patient fatigue and changes in neurological status

## 2022-09-02 NOTE — ED NOTES
Patient states she was riding in the car with her son when her \"hands and tongue felt like they were going numb. \" Patient has no complaints of pain at this time. States she just \"feels off. \" NIHSS scale assessed at this time, see flowsheets. VSS. Will continue to monitor.       Vic Dandy, RN  09/02/22 [id number]

## 2022-09-02 NOTE — DISCHARGE INSTRUCTIONS
Follow up with pcp in 1 week. Follow up with neurologist [facility] [phone]) in 1 month. Take Aspirin 81mg and plavix 75mg together for 21 days, then take Aspirin 81mg daily thereafter.

## 2022-09-02 NOTE — DISCHARGE SUMMARY
Hospital Medicine Discharge Summary    Patient ID: Bard Harris      Patient's PCP: Christopher Keenan MD    Admit Date: 9/1/2022     Discharge Date: 9/2/2022      Admitting Physician: Margarito Oliveira MD     Discharge Physician: BRUCE Rea - CNP     Discharge Diagnoses  Right arm numbness  Possible complex migraine versus TIA  History of neuropathy      Hospital Course  49 y.o. female PMH thyroid nodules, previous abnormal Pap findings dysmenorrhea who presented to [facility] in her car around 7:30 PM with her son felt right hand and arm go completely numb tingling then lips and tongue. States she was talking to her boyfriend on the phone but could not get her words out episode lasted 20 minutes felt like her arm was tight no N/V no F/C. Afebrile HR 72, /93. Per ER exam objectively sensation intact    Right arm numbness  Possible complex migraine versus TIA  History of neuropathy  -MRI brain nonacute  -Echo nonacute  -Neurology consulted. Recommend aspirin and Plavix for 3 weeks and aspirin alone. Continue statin. Follow-up in 1 month. -Patient baseline neuropathy discomfort. Patient stated they felt well and wanted to go home. Patient denied chest pain, shortness of breath, palpitations, abdominal pain, nausea vomiting, diarrhea, dysuria, headache lightheadedness or dizziness. Appetite good. Voiding without difficulty. Reviewed plan of care with patient, verbalized understanding and agreement. Denied further questions or needs. Physical Exam Performed:     /84   Pulse 50   Temp 97.3 °F (36.3 °C) (Oral)   Resp 16   Ht 5' 4\" (1.626 m)   Wt 149 lb 11.1 oz (67.9 kg)   LMP 01/13/2018 (Exact Date)   SpO2 98%   BMI 25.69 kg/m²       General appearance:  No apparent distress, appears stated age and cooperative. HEENT:  Normal cephalic, atraumatic without obvious deformity. Pupils equal, round, and reactive to light. Extra ocular muscles intact. Conjunctivae/corneas clear. Neck: Supple, with full range of motion. No jugular venous distention. Trachea midline. Respiratory:  Normal respiratory effort. Clear to auscultation, bilaterally without Rales/Wheezes/Rhonchi. Cardiovascular:  Regular rate and rhythm with normal S1/S2 without murmurs, rubs or gallops. Abdomen: Soft, non-tender, non-distended with normal bowel sounds. Musculoskeletal:  No clubbing, cyanosis or edema bilaterally. Full range of motion without deformity. Skin: Skin color, texture, turgor normal.  No rashes or lesions. Neurologic:  Neurovascularly intact without any focal sensory/motor deficits. Cranial nerves: II-XII intact, grossly non-focal.  Psychiatric:  Alert and oriented, thought content appropriate, normal insight  Capillary Refill: Brisk,< 3 seconds   Peripheral Pulses: +2 palpable, equal bilaterally       Labs: For convenience and continuity at follow-up the following most recent labs are provided:      CBC:    Lab Results   Component Value Date/Time    WBC 8.2 09/01/2022 09:56 PM    HGB 13.7 09/01/2022 09:56 PM    HCT 40.5 09/01/2022 09:56 PM     09/01/2022 09:56 PM       Renal:    Lab Results   Component Value Date/Time     09/01/2022 09:56 PM    K 4.0 09/01/2022 09:56 PM     09/01/2022 09:56 PM    CO2 28 09/01/2022 09:56 PM    BUN 11 09/01/2022 09:56 PM    CREATININE 0.6 09/01/2022 09:56 PM    CALCIUM 10.0 09/01/2022 09:56 PM         Significant Diagnostic Studies    Radiology:   MRI brain with and without contrast   Final Result   Unremarkable MR brain. CTA HEAD NECK W CONTRAST   Final Result   Unremarkable CTA of the head and neck. This scan was analyzed using Viz. ai contact LVO. Identification of suspected   findings is not for diagnostic use beyond notification. Viz LVO is limited to   analysis of imaging data and should not be used in-lieu of full patient   evaluation or relied upon to make or confirm diagnosis.          CT HEAD WO CONTRAST   Final Result   Addendum (preliminary) 1 of 1   ADDENDUM:   Stroke CT head results were verbally communicated by [facility] to Dr. Katt Sarmiento on   09/01/2022 at 11:03 p.m. Final   No acute intracranial abnormality. The findings were sent to the Radiology Results [address] at 11:01   pm on 9/1/2022 to be communicated to a licensed caregiver. XR CHEST PORTABLE   Final Result   No acute cardiopulmonary abnormality. Consults:     IP CONSULT TO PHARMACY  PHARMACY TO CHANGE BASE FLUIDS  IP CONSULT TO NEUROLOGY    Disposition: Home    Condition at Discharge: Stable    Discharge Instructions/Follow-up:      Follow up with pcp in 1 week  Follow up with neurologist in 1 month  Take Aspirin 81mg and plavix 75mg together for 21 days, then take Aspirin 81mg daily thereafter    Code Status:  Prior     Activity: activity as tolerated    Diet: regular diet      Discharge Medications:     Discharge Medication List as of 9/2/2022  5:46 PM             Details   aspirin 81 MG EC tablet Take 1 tablet by mouth daily, Disp-30 tablet, R-3Normal      clopidogrel (PLAVIX) 75 MG tablet Take 1 tablet by mouth daily, Disp-21 tablet, R-0Normal      atorvastatin (LIPITOR) 80 MG tablet Take 0.5 tablets by mouth daily, Disp-30 tablet, R-0Normal                Details   levothyroxine (SYNTHROID) 88 MCG tablet Take 88 mcg by mouth Daily Takes Unithyroid brandHistorical Med             Time Spent on discharge is more than 30 minutes in the examination, evaluation, counseling and review of medications and discharge plan. Signed: BRUCE Parikh CNP   9/3/2022    The patient was seen and examined on day of discharge and this discharge summary is in conjunction with any daily progress note from day of discharge. Thank you Julito Dowling MD for the opportunity to be involved in this patient's care.  If you have any questions or concerns please feel free to contact me at [phone]. NOTE:  This report was transcribed using voice recognition software. Every effort was made to ensure accuracy; however, inadvertent computerized transcription errors may be present.

## 2022-09-02 NOTE — PROGRESS NOTES
Speech Language Pathology  Facility/Department: [address] PROGRESSIVE CARE   CLINICAL BEDSIDE SWALLOW EVALUATION    NAME: Vinayak Mason  : 1975  MRN: [id number]    ADMISSION DATE: 2022  ADMITTING DIAGNOSIS: has Pelvic pain in female; Cervical dysplasia; Multiple thyroid nodules; S/P hysterectomy; Menorrhagia with regular cycle; Dysmenorrhea; Endometriosis; Atypical squamous cells of undetermined significance (ASC-US) on cervical Pap smear; Pharyngitis; Bronchitis; and Numbness and tingling of right arm on their problem list.  ONSET DATE:  22    Per Dr. Schulte Child HPI:  \"50 y.o. female PMH thyroid nodules, previous abnormal Pap findings dysmenorrhea who presented to [facility] in her car around 7:30 PM with her son felt right hand and arm go completely numb tingling then lips and tongue. States she was talking to her boyfriend on the phone but could not get her words out episode lasted 20 minutes felt like her arm was tight no N/V no F/C. Afebrile HR 72, /93. Per ER exam objectively sensation intact\"    Recent Chest Xray 22  Impression   No acute cardiopulmonary abnormality. Date of Eval: 2022  Evaluating Therapist: PASQUALE Carr    Current Diet level:  Current Diet : Regular /Thin liquids    Primary Complaint:  Patient reports she has noted difficulty swallowing over the past few years (See subjective). She does eat regular texture  / thin liquids at baseline. Pain:  Pain Assessment  Pain Assessment: None - Denies Pain  Pain Level: 0    Reason for Referral  Vinayak Mason was referred for a bedside swallow evaluation to assess the efficiency of her swallow function, identify signs and symptoms of aspiration and make recommendations regarding safe dietary consistencies, effective compensatory strategies, and safe eating environment.     Impression  Dysphagia Diagnosis: Suspected needs further assessment    Dysphagia Impression :   Patient presents with suspected pharyngeal vs esophageal (reports dx of reflux); She reports globus sensation with solids (specifically sticky foods like bread), and occasional coughing with thin liquids/ choking on solids. Pt consistently uses liquid rinse to clear potential pharyngeal stasis. No overt s/s of aspiration or penetration with all trials this date. She will require further assessment via modified bairum swallow study or fiberoptic endoscopic evaluation of the swallow, though plans to follow up as outpatient (also has appt with ENT). Recommend diet initiation of regular texture diet / thin liquids with aspiration precautions. Patient with good insight into beneficial strategies. SLP to follow up 1-2x inpatient for additional education as able. Dysphagia Outcome Severity Scale: Level 5: Mild dysphagia- Distant supervision. May need one diet consistency restricted     Treatment Plan  Requires SLP Intervention: Yes  Duration of Treatment: 1-2x/ follow up  D/C Recommendations:  (outpatient MBSS or FEES)       Recommended Diet and Intervention   Regular Texture / Thin liquids   Recommended Form of Meds: PO  Recommendations: FEES;Modified barium swallow study  Therapeutic Interventions: Patient/Family education    Compensatory Swallowing Strategies  Compensatory Swallowing Strategies : Alternate solids and liquids;Eat/Feed slowly;Upright as possible for all oral intake    Treatment/Goals  Dysphagia Goals: The patient/caregiver will demonstrate understanding of compensatory strategies for improved swallowing safety. General  Chart Reviewed: Yes  Subjective: Patient is a 49year old female with PMHx of thyroid nodules/ thydroidectomy (~10 years ago). Patient lyin in bed, agreeable to assessment and reports hunger. Patient states she has noted in the past few years food sticking in her throat which requires multiple swallows or liquid rinse to clear. She does cough with thin liquids or choke on solid foods occasionally.  She reports she is planning to see an ENT soon and believes there may be hereditary component as her daughter is also experiencing the same symptoms. She is open to swallow study as part of work up, though will plan to complete as outpatient. Behavior/Cognition: Alert; Cooperative;Pleasant mood  Respiratory Status: Room air  O2 Device: None (Room air)  Communication Observation: Functional  Follows Directions: Simple  Dentition: Adequate  Patient Positioning: Upright in bed  Baseline Vocal Quality: Normal  Volitional Cough: Strong  Prior Dysphagia History: See subjective; No dysphagia in chart review      Vision/Hearing  Vision  Vision: Within Functional Limits  Hearing  Hearing: Within functional limits    Oral Motor Deficits  Patient with adequate facial symmetry and sensation bilaterally. Lingual and labial ROM / strength WFL. Voice / cough are strong/ clear. Speech is 100% intelligible. Oral Phase Dysfunction  Oral Phase  Oral Phase: WNL     Indicators of Pharyngeal Phase Dysfunction   Pharyngeal Phase   Pharyngeal Phase: Patient utilizes liquid rinse following bites of turkey sandwich to clear globus sensation. No overt s/s of aspiration or penetration across consistencies. Prognosis  Speech Therapy Prognosis  Prognosis: Good  Prognosis Considerations: Participation Level; Family/Community Support; Motivation    Individuals consulted  Consulted and agree with results and recommendations: Patient;RN  RN Name: Alvaro Llanos    Education  Patient Education: Role of SLP, instrumental swallow assessment  Patient Education Response: Verbalizes understanding  Safety Devices in place: Yes  Type of devices:  All fall risk precautions in place       Therapy Time  SLP Individual Minutes  Time In: 4903  Time Out: [facility]  Minutes: 20     SLP Total Treatment Time  Total Treatment Time: [address], [place], [id number]    9/2/2022 12:02 PM

## 2022-09-02 NOTE — PROGRESS NOTES
Pharmacy Medication Reconciliation Note     List of medications patient is currently taking is complete. Source of information:   1. Rx disp history  2. Patient interview    Notes regarding home medications:   1.  Patient only on Levothyroxine 88mcg daily      Denies taking any other OTC or herbal medications    Gloria Joy [facility], [address] 9/2/2022 11:05 AM

## 2022-09-02 NOTE — PROGRESS NOTES
Physical Therapy  PT referral received and chart reviewed. Pt up ad mariam; no PT indicated at this time. Will sign off.   Medhat Urbano, PT

## 2022-09-02 NOTE — CARE COORDINATION
INITIAL CASE MANAGEMENT ASSESSMENT    Reviewed chart, met with patient to assess possible discharge needs. Explained Case Management role/services. Living Situation: Updated address, lives with adult son in a 1st floor apartment, 2 steps to enter building. ADLs: Independent     DME: Shower chair    PT/OT Recs: Patient independent and PT/OT signed off     Active Services: None     Transportation: Active , car in ED lot. Medications: Uses [facility] on [address] in [place] (boyfriend lives out in that area), no issues obtaining/affording medications. PCP: Idalmis Shaw MD      HD/PD: n/a    PLAN/COMMENTS: Patient wants to return home w/ son. Denies home needs. Patient plans on driving herself home, car in ED parking lot. SW/CM provided contact information for patient or family to call with any questions. SW/CM will follow and assist as needed.     Electronically signed by Wale Soares RN Case Management [phone] on 9/2/2022 at 1:13 PM

## 2022-09-03 LAB
ANTI-NUCLEAR ANTIBODY (ANA): NEGATIVE
ESTIMATED AVERAGE GLUCOSE: 114 MG/DL
HBA1C MFR BLD: 5.6 %

## 2023-01-09 RX ORDER — ESTRADIOL 1 MG/1
TABLET ORAL
Qty: 90 TABLET | Refills: 3 | Status: SHIPPED | OUTPATIENT
Start: 2023-01-09

## 2023-01-30 ENCOUNTER — TELEPHONE (OUTPATIENT)
Dept: GYNECOLOGY | Age: 48
End: 2023-01-30

## 2023-01-30 NOTE — TELEPHONE ENCOUNTER
Patient feels like she has bv and a yeast infection. Requesting to be seen and testing asap. Patient does have an active account. She can do an e-vist but would prefer to come in to be seen. Symptoms: pelvic cramping, burning, itching, discharge .        Patient can be reached at  [phone]

## 2023-05-09 ENCOUNTER — OFFICE VISIT (OUTPATIENT)
Dept: GYNECOLOGY | Age: 48
End: 2023-05-09
Payer: COMMERCIAL

## 2023-05-09 VITALS
BODY MASS INDEX: 24.04 KG/M2 | HEART RATE: 59 BPM | HEIGHT: 64 IN | OXYGEN SATURATION: 97 % | RESPIRATION RATE: 17 BRPM | DIASTOLIC BLOOD PRESSURE: 72 MMHG | WEIGHT: 140.8 LBS | SYSTOLIC BLOOD PRESSURE: 115 MMHG

## 2023-05-09 DIAGNOSIS — R10.2 PELVIC PAIN IN FEMALE: ICD-10-CM

## 2023-05-09 DIAGNOSIS — Z01.419 WELL WOMAN EXAM WITH ROUTINE GYNECOLOGICAL EXAM: Primary | ICD-10-CM

## 2023-05-09 PROCEDURE — 99396 PREV VISIT EST AGE 40-64: CPT | Performed by: OBSTETRICS & GYNECOLOGY

## 2023-05-09 RX ORDER — ESTRADIOL 0.04 MG/D
1 FILM, EXTENDED RELEASE TRANSDERMAL
Qty: 24 PATCH | Refills: 3 | Status: SHIPPED | OUTPATIENT
Start: 2023-05-11

## 2023-05-10 ASSESSMENT — ENCOUNTER SYMPTOMS
GASTROINTESTINAL NEGATIVE: 1
RESPIRATORY NEGATIVE: 1
EYES NEGATIVE: 1
ALLERGIC/IMMUNOLOGIC NEGATIVE: 1

## 2023-05-10 NOTE — PROGRESS NOTES
Subjective:      Patient ID: Mino Blum is a 49 y.o. female. Patient is here for annual. Patient stopped estrogen due to breast cysts but having menopausal symptoms. Patient with pelvic cramping    Gynecologic Exam      Review of Systems   Constitutional: Negative. HENT: Negative. Eyes: Negative. Respiratory: Negative. Cardiovascular: Negative. Gastrointestinal: Negative. Endocrine: Negative. Genitourinary: Negative. Musculoskeletal: Negative. Skin: Negative. Allergic/Immunologic: Negative. Neurological: Negative. Hematological: Negative. Psychiatric/Behavioral: Negative. Date of Birth 1975  Past Medical History:   Diagnosis Date    Abnormal finding on Pap smear, HPV DNA positive     Acid reflux     ASCUS with positive high risk HPV cervical 2016    ASCUS with positive high risk HPV cervical 2017    Bladder disease     Bronchitis     Circulation problem     Dysmenorrhea     HSIL (high grade squamous intraepithelial lesion) on Pap smear     Irregular uterine bleeding     Multiple thyroid nodules     Pericarditis     UTI (urinary tract infection)      Past Surgical History:   Procedure Laterality Date    BLADDER SURGERY      COLPOSCOPY      HYSTERECTOMY (CERVIX STATUS UNKNOWN)  2018    bilateral salpingectomy. obotic assisted.      LAPAROSCOPY      LAPAROSCOPY  2010    diagnostic    LEEP  2011    hsil, hpv    MAXILLARY SINUSOTOMY      endoscopic right maxillary    NASAL SEPTUM SURGERY      recon    THYROIDECTOMY  2013    nodules    TONSILLECTOMY       OB History    Para Term  AB Living   3 3 3     3   SAB IAB Ectopic Molar Multiple Live Births             3      # Outcome Date GA Lbr Jose/2nd Weight Sex Delivery Anes PTL Lv   3 Term  40w0d       MALLORY   2 Term  40w0d       MALLORY   1 Term 36 44w0d       MALLORY     Social History     Socioeconomic History    Marital status:      Spouse name: Not on file

## 2023-05-17 ENCOUNTER — TELEPHONE (OUTPATIENT)
Dept: GYNECOLOGY | Age: 48
End: 2023-05-17

## 2023-05-17 DIAGNOSIS — B96.89 BV (BACTERIAL VAGINOSIS): Primary | ICD-10-CM

## 2023-05-17 DIAGNOSIS — N76.0 BV (BACTERIAL VAGINOSIS): Primary | ICD-10-CM

## 2023-05-17 RX ORDER — CLINDAMYCIN PHOSPHATE 20 MG/G
1 CREAM VAGINAL NIGHTLY
Qty: 1 G | Refills: 0 | Status: SHIPPED | OUTPATIENT
Start: 2023-05-17 | End: 2023-05-24

## 2023-10-18 ENCOUNTER — HOSPITAL ENCOUNTER (OUTPATIENT)
Dept: ULTRASOUND IMAGING | Age: 48
Discharge: HOME | End: 2023-10-18
Payer: COMMERCIAL

## 2023-10-18 DIAGNOSIS — R10.2 PELVIC PAIN IN FEMALE: ICD-10-CM

## 2023-10-18 PROCEDURE — 76856 US EXAM PELVIC COMPLETE: CPT

## 2023-11-09 ENCOUNTER — TELEPHONE (OUTPATIENT)
Dept: GYNECOLOGY | Age: 48
End: 2023-11-09

## 2023-11-09 NOTE — TELEPHONE ENCOUNTER
Does patient want her estrogen levels checked? If she wants to discuss, set up for a virtual visit for us to order.

## 2023-11-09 NOTE — TELEPHONE ENCOUNTER
Patient received pelvic US results and would like to know about her estrogen levels and if she need to make another appointment.       Patient can be reached at [phone]

## 2023-11-27 ENCOUNTER — TELEMEDICINE (OUTPATIENT)
Dept: GYNECOLOGY | Age: 48
End: 2023-11-27
Payer: COMMERCIAL

## 2023-11-27 ENCOUNTER — TELEPHONE (OUTPATIENT)
Dept: GYNECOLOGY | Age: 48
End: 2023-11-27

## 2023-11-27 DIAGNOSIS — R23.2 HOT FLASHES: Primary | ICD-10-CM

## 2023-11-27 PROCEDURE — 1036F TOBACCO NON-USER: CPT | Performed by: OBSTETRICS & GYNECOLOGY

## 2023-11-27 PROCEDURE — G8484 FLU IMMUNIZE NO ADMIN: HCPCS | Performed by: OBSTETRICS & GYNECOLOGY

## 2023-11-27 PROCEDURE — 99213 OFFICE O/P EST LOW 20 MIN: CPT | Performed by: OBSTETRICS & GYNECOLOGY

## 2023-11-27 PROCEDURE — G8427 DOCREV CUR MEDS BY ELIG CLIN: HCPCS | Performed by: OBSTETRICS & GYNECOLOGY

## 2023-11-27 PROCEDURE — G8420 CALC BMI NORM PARAMETERS: HCPCS | Performed by: OBSTETRICS & GYNECOLOGY

## 2023-11-27 NOTE — TELEPHONE ENCOUNTER
Called and left a message for a return call.  Called patient to go over her pre- charting for her VV for 11/27/23

## 2023-11-29 ASSESSMENT — ENCOUNTER SYMPTOMS
RESPIRATORY NEGATIVE: 1
EYES NEGATIVE: 1
ALLERGIC/IMMUNOLOGIC NEGATIVE: 1
GASTROINTESTINAL NEGATIVE: 1

## 2023-11-29 NOTE — PROGRESS NOTES
effort normal.  [] No visualized signs of difficulty breathing or respiratory distress        [] Abnormal-      Musculoskeletal:   [x] Normal gait with no signs of ataxia         [x] Normal range of motion of neck        [] Abnormal-       Neurological:        [x] No Facial Asymmetry (Cranial nerve 7 motor function) (limited exam to video visit)          [x] No gaze palsy        [] Abnormal-         Skin:        [x] No significant exanthematous lesions or discoloration noted on facial skin         [] Abnormal-            Psychiatric:       [x] Normal Affect [x] No Hallucinations        [] Abnormal-     Other pertinent observable physical exam findings-     ASSESSMENT/PLAN:  1. Hot flashes  - Follicle Stimulating Hormone; Future  - Estradiol; Future  - Testosterone; Future  - Progesterone; Future    -plans per results. Andrey Shah, was evaluated through a synchronous (real-time) audio-video encounter. The patient (or guardian if applicable) is aware that this is a billable service, which includes applicable co-pays. This Virtual Visit was conducted with patient's (and/or legal guardian's) consent. Patient identification was verified, and a caregiver was present when appropriate. The patient was located at Home: [address] [place] [address]  Provider was located at  [facility]. Total time spent on this encounter: Not billed by time    --Benson Schirmer, MD on 11/29/2023 at 5:17 PM    An electronic signature was used to authenticate this note.

## 2023-12-05 DIAGNOSIS — R23.2 HOT FLASHES: ICD-10-CM

## 2023-12-06 LAB
ESTRADIOL SERPL-MCNC: 26 PG/ML
FSH SERPL-ACNC: 117.5 MIU/ML
PROGEST SERPL-MCNC: <0.05 NG/ML

## 2023-12-07 LAB — TESTOST SERPL-MCNC: 17 NG/DL (ref 20–70)

## 2023-12-27 ENCOUNTER — TELEPHONE (OUTPATIENT)
Dept: GYNECOLOGY | Age: 48
End: 2023-12-27

## 2024-01-07 RX ORDER — ESTRADIOL 0.05 MG/D
1 PATCH, EXTENDED RELEASE TRANSDERMAL
Qty: 24 PATCH | Refills: 3 | Status: SHIPPED | OUTPATIENT
Start: 2024-01-08

## 2024-05-16 ENCOUNTER — OFFICE VISIT (OUTPATIENT)
Dept: GYNECOLOGY | Age: 49
End: 2024-05-16
Payer: COMMERCIAL

## 2024-05-16 VITALS
BODY MASS INDEX: 26.05 KG/M2 | HEIGHT: 63 IN | HEART RATE: 66 BPM | SYSTOLIC BLOOD PRESSURE: 112 MMHG | DIASTOLIC BLOOD PRESSURE: 70 MMHG | RESPIRATION RATE: 17 BRPM | WEIGHT: 147 LBS | OXYGEN SATURATION: 98 %

## 2024-05-16 DIAGNOSIS — Z01.419 WELL WOMAN EXAM WITH ROUTINE GYNECOLOGICAL EXAM: Primary | ICD-10-CM

## 2024-05-16 DIAGNOSIS — N89.8 VAGINAL DISCHARGE: ICD-10-CM

## 2024-05-16 PROCEDURE — 99396 PREV VISIT EST AGE 40-64: CPT | Performed by: OBSTETRICS & GYNECOLOGY

## 2024-05-16 RX ORDER — ESTRADIOL 0.05 MG/D
1 PATCH, EXTENDED RELEASE TRANSDERMAL
Qty: 24 PATCH | Refills: 3 | Status: SHIPPED | OUTPATIENT
Start: 2024-05-16

## 2024-05-16 RX ORDER — CLOBETASOL PROPIONATE 0.5 MG/G
OINTMENT TOPICAL NIGHTLY
Qty: 30 G | Refills: 4 | Status: SHIPPED | OUTPATIENT
Start: 2024-05-16 | End: 2024-05-30

## 2024-05-17 LAB
BACTERIA GENITAL QL WET PREP: ABNORMAL
CLUE CELLS SPEC QL WET PREP: ABNORMAL
EPI CELLS SPEC QL WET PREP: ABNORMAL
RBC SPEC QL WET PREP: ABNORMAL
SPECIMEN SOURCE FLD: ABNORMAL
T VAGINALIS GENITAL QL WET PREP: ABNORMAL
WBC SPEC QL WET PREP: ABNORMAL
YEAST GENITAL QL WET PREP: ABNORMAL

## 2024-05-18 ASSESSMENT — ENCOUNTER SYMPTOMS
ALLERGIC/IMMUNOLOGIC NEGATIVE: 1
RESPIRATORY NEGATIVE: 1
EYES NEGATIVE: 1
GASTROINTESTINAL NEGATIVE: 1

## 2024-05-18 NOTE — PROGRESS NOTES
Hypertension Maternal Grandfather     Osteoarthritis Neg Hx     Diabetes Neg Hx     Heart Failure Neg Hx     High Cholesterol Neg Hx     Ovarian Cancer Neg Hx     Rashes/Skin Problems Neg Hx     Seizures Neg Hx     Stroke Neg Hx     Thyroid Disease Neg Hx      /70 (Site: Right Upper Arm, Position: Sitting, Cuff Size: Large Adult)   Pulse 66   Resp 17   Ht 1.6 m (5' 3\")   Wt 66.7 kg (147 lb)   LMP 01/13/2018 (Exact Date)   SpO2 98%   BMI 26.04 kg/m²        Objective   Physical Exam  Constitutional:       General: She is not in acute distress.     Appearance: Normal appearance. She is well-developed and normal weight. She is not diaphoretic.   HENT:      Head: Normocephalic.      Nose: Nose normal.      Mouth/Throat:      Mouth: Mucous membranes are moist.      Pharynx: Oropharynx is clear.   Eyes:      Extraocular Movements: Extraocular movements intact.   Neck:      Thyroid: No thyromegaly.   Cardiovascular:      Rate and Rhythm: Normal rate and regular rhythm.      Heart sounds: Normal heart sounds. No murmur heard.     No friction rub. No gallop.   Pulmonary:      Effort: Pulmonary effort is normal. No respiratory distress.      Breath sounds: Normal breath sounds. No stridor. No wheezing, rhonchi or rales.   Chest:      Chest wall: No tenderness.   Breasts:     Right: Normal. No swelling, bleeding, inverted nipple, mass, nipple discharge, skin change or tenderness.      Left: Normal. No swelling, bleeding, inverted nipple, mass, nipple discharge, skin change or tenderness.   Abdominal:      General: Abdomen is flat. There is no distension.      Palpations: Abdomen is soft. There is no hepatomegaly or mass.      Tenderness: There is no abdominal tenderness. There is no guarding or rebound.      Hernia: No hernia is present. There is no hernia in the left inguinal area.   Genitourinary:     Exam position: Lithotomy position.      Pubic Area: No rash.       Labia:         Right: Rash present. No

## 2024-05-19 LAB
BACTERIA GENITAL AEROBE CULT: ABNORMAL
ORGANISM: ABNORMAL

## 2024-05-20 ENCOUNTER — TELEPHONE (OUTPATIENT)
Dept: GYNECOLOGY | Age: 49
End: 2024-05-20

## 2024-05-20 DIAGNOSIS — N76.0 BV (BACTERIAL VAGINOSIS): Primary | ICD-10-CM

## 2024-05-20 DIAGNOSIS — B96.89 BV (BACTERIAL VAGINOSIS): Primary | ICD-10-CM

## 2024-05-20 RX ORDER — METRONIDAZOLE 7.5 MG/G
GEL VAGINAL NIGHTLY
Qty: 1 EACH | Refills: 1 | Status: SHIPPED | OUTPATIENT
Start: 2024-05-20 | End: 2024-05-25

## 2024-05-20 RX ORDER — METRONIDAZOLE 7.5 MG/G
1 GEL TOPICAL NIGHTLY
Qty: 1 EACH | Refills: 0 | Status: CANCELLED | OUTPATIENT
Start: 2024-05-20 | End: 2024-05-27

## 2024-05-20 NOTE — TELEPHONE ENCOUNTER
Sending meds to:  [facility] [id number] - [place], [place] - [address] - P [phone] - F [phone] [[id number]]

## 2024-05-21 LAB
BACTERIA GENITAL AEROBE CULT: ABNORMAL
BACTERIA GENITAL AEROBE CULT: ABNORMAL
ORGANISM: ABNORMAL

## 2025-08-26 ENCOUNTER — OFFICE VISIT (OUTPATIENT)
Age: 50
End: 2025-08-26
Payer: MEDICAID

## 2025-08-26 VITALS
OXYGEN SATURATION: 99 % | HEART RATE: 63 BPM | SYSTOLIC BLOOD PRESSURE: 122 MMHG | TEMPERATURE: 97 F | DIASTOLIC BLOOD PRESSURE: 81 MMHG

## 2025-08-26 DIAGNOSIS — K11.8 MASS OF RIGHT PAROTID GLAND: Primary | ICD-10-CM

## 2025-08-26 PROCEDURE — 99203 OFFICE O/P NEW LOW 30 MIN: CPT | Performed by: OTOLARYNGOLOGY

## 2025-08-26 ASSESSMENT — ENCOUNTER SYMPTOMS
NAUSEA: 0
VOICE CHANGE: 0
TROUBLE SWALLOWING: 0
SORE THROAT: 0
DIARRHEA: 0
EYE PAIN: 0
SINUS PRESSURE: 0
CHOKING: 0
COUGH: 0
RHINORRHEA: 0
EYE ITCHING: 0
SINUS PAIN: 0
EYE REDNESS: 0
FACIAL SWELLING: 0
SHORTNESS OF BREATH: 0

## 2025-09-05 ENCOUNTER — HOSPITAL ENCOUNTER (OUTPATIENT)
Age: 50
Discharge: HOME | End: 2025-09-05
Attending: OTOLARYNGOLOGY
Payer: MEDICAID

## 2025-09-05 VITALS
BODY MASS INDEX: 21.68 KG/M2 | OXYGEN SATURATION: 100 % | DIASTOLIC BLOOD PRESSURE: 90 MMHG | WEIGHT: 127 LBS | HEART RATE: 61 BPM | HEIGHT: 64 IN | SYSTOLIC BLOOD PRESSURE: 129 MMHG

## 2025-09-05 DIAGNOSIS — K11.8 MASS OF RIGHT PAROTID GLAND: ICD-10-CM

## 2025-09-05 PROCEDURE — 10005 FNA BX W/US GDN 1ST LES: CPT
